# Patient Record
(demographics unavailable — no encounter records)

---

## 2023-07-20 NOTE — P.MSEPDOC
Presenting Problems





- Arrival Data


Date of Arrival on Unit: 23


Time of Arrival on Unit: 19:11


Mode of Transport: Ambulatory





- Complaint


OB-Reason for Admission/Chief Complaint: Possible Onset of Labor


Comment: pt states that she has period cramping for a few days





Prenatal Medical History





- Pregnancy Information


: 3


Para: 1


Term: 0


: 1


Abortions: Spontaneous or Elective: 1


Number of Living Children: 1





- Gestational Age


Gestational Age by TOM (wks/days): 34 Weeks and 1 Days





Review of Systems





- Review of Systems


Constitutional: No problems


Breast: No problems


ENT: No problems


Cardiovascular: No problems


Respiratory: No problems


Gastrointestinal: No problems


Genitourinary: No problems


Musculoskeletal: No problems


Neurological: No problems


Skin: No problems





Vital Signs





- Temperature


Temperature: 97.6 F


Temperature Source: Oral





- Pulse


  ** Pulse Oximetery


Pulse Rate: 99


Pulse Assessment Method: Pulse Oximetry





- Respirations


Respiratory Rate: 16


Oxygen Delivery Method: Room Air





- Blood Pressure


  ** Right Arm


Blood Pressure: 134/80


Blood Pressure Mean: 98


Blood Pressure Source: Automatic Cuff





Medical Screen Scoring





- Cervical Exam


Dilation (cm): 1


Effacement (%): 50


Station: -3


Membranes: Intact





- Uterine Contractions


Resting: Soft to palpation





- Fetal Assessment - Baby A


Baseline FHR: 130


Fetal Heart Rate - NICHD Category: Category I (Normal)


NST: Reactive





Physician Notification





- Physician Notified


Physician Notified Date: 23


Physician Notified Time: 19:44


Physician: Prosper Barillas


New Order Received: Yes





- Notification Comment


Comment: keep on monitor for 1 hour and then d/c home if no changes





Maternal Fetal Triage Index





- Maternal Fetal Triage Index


Presenting for scheduled procedure w/no complaint: No





- Stat/Priority 1


Stat Priority 1: No





- Urgent/Priority 2


Urgent Priority 2: No





- Prompt/Priority 3


Prompt Priority 3: Yes


Criteria Met for Priority 3: pt states that she has been having period cramping 

for a few days





Disposition





- Disposition


OB Disposition: Discharge to home


Discharge Date: 23


Discharge Time: 20:45


I agree with the RN Medical Screening Exam: Yes


Case reviewed; plan agreed upon as documented in EMR&OBIX.: Yes


Diagnosis: FALSE LABOR BEFORE 37 COMPLETED WEEKS OF GEST, THIRD TRI

## 2023-08-02 NOTE — P.MSEPDOC
Presenting Problems





- Arrival Data


Date of Arrival on Unit: 23


Time of Arrival on Unit: 21:25


Mode of Transport: Ambulatory





- Complaint


OB-Reason for Admission/Chief Complaint: Pain


Comment: vaginal pressure, back pain, contractions on and off today.





Prenatal Medical History





- Pregnancy Information


: 3


Para: 1


Term: 0


: 1


Abortions: Spontaneous or Elective: 1


Number of Living Children: 1





- Gestational Age


Gestational Age by TOM (wks/days): 36 Weeks and 0 Days





- Prenatal History


Pregnancy Complications: Preeclampsia


Comment: Preeclampsia induction with first pregnancy (35 5/7)





Review of Systems





- Review of Systems


Constitutional: No problems


Breast: No problems


ENT: No problems


Cardiovascular: Irregular heartbeat


Respiratory: No problems


Gastrointestinal: No problems


Genitourinary: No problems


Musculoskeletal: No problems


Neurological: No problems


Skin: No problems


Comment: Pt has history of SVT since before her first pregnancy, no cardiac meds

taken.





Vital Signs





- Temperature


Temperature: 98.3 F


Temperature Source: Oral





- Pulse


  ** Right Sitting


Pulse Rate: 93


Pulse Assessment Method: Automatic Cuff





- Respirations


Respiratory Rate: 16


Oxygen Delivery Method: Room Air


O2 Sat by Pulse Oximetry: 99





- Blood Pressure


  ** Right Arm Sitting


Blood Pressure: 135/81


Blood Pressure Mean: 99


Blood Pressure Source: Automatic Cuff





- Comment


Vital Signs Comment: second pressure 131/80





Medical Screen Scoring





- Cervical Exam


Dilation (cm): 2


Effacement (%): 70


Station: -3


Membranes: Intact





- Fetal Assessment - Baby A


Baseline FHR: 145


Fetal Heart Rate - NICHD Category: Category I (Normal)


NST: Reactive





Physician Notification





- Physician Notified


Physician Notified Date: 23


Physician Notified Time: 22:04


Physician: Dr Howe


New Order Received: Yes (Pt may be discharged home. Pt has apt tomorrow at 9am.)





- Notification Comment


Comment: Pt instructed to rest, increase fluid intake and to keep her sced apt 

for the am.





Maternal Fetal Triage Index





- Maternal Fetal Triage Index


Presenting for scheduled procedure w/no complaint: No





- Stat/Priority 1


Stat Priority 1: No





- Urgent/Priority 2


Urgent Priority 2: No





- Prompt/Priority 3


Prompt Priority 3: No





- Non-Urgent/Priority 4


Non-Urgent Priority 4: Yes


Criteria Met for Priority 4: vaginal pressure, back pain, contractions (non 

noted today while on the monitor), pt appears relaxed with no signs of distress.





Disposition





- Disposition


OB Disposition: Discharge to home, Written follow up instructions reviewed


Discharge Date: 23


Discharge Time: 22:10


I agree with the RN Medical Screening Exam: Yes


Case reviewed; plan agreed upon as documented in EMR&OBIX.: Yes


Diagnosis: FALSE LABOR BEFORE 37 COMPLETED WEEKS OF GEST, THIRD TRI

## 2023-08-05 NOTE — P.MSEPDOC
Presenting Problems





- Arrival Data


Date of Arrival on Unit: 23


Time of Arrival on Unit: 16:40


Mode of Transport: Ambulatory





- Complaint


OB-Reason for Admission/Chief Complaint: PIH


Comment: Headache for several days, increased BP, feeling "spacey"





Prenatal Medical History





- Pregnancy Information


: 3


Para: 1


Term: 0


: 1


Abortions: Spontaneous or Elective: 1


Number of Living Children: 1





- Gestational Age


Gestational Age by TOM (wks/days): 36 Weeks and 3 Days





- Prenatal History


Pregnancy Complications: Prior 





Review of Systems





- Review of Systems


Constitutional: No problems


Breast: No problems


ENT: No problems


Cardiovascular: No problems


Respiratory: No problems


Gastrointestinal: No problems


Genitourinary: No problems


Musculoskeletal: No problems


Neurological: No problems


Skin: No problems





Vital Signs





- Temperature


Temperature: 98.3 F


Temperature Source: Temporal Artery Scan





- Pulse


  ** Right Sitting Brachial


Pulse Rate: 107


Pulse Assessment Method: Pulse Oximetry





- Respirations


Respiratory Rate: 16


Oxygen Delivery Method: Room Air


O2 Sat by Pulse Oximetry: 96





- Blood Pressure


  ** Right Arm Sitting


Blood Pressure: 143/93


Blood Pressure Mean: 109


Blood Pressure Source: Automatic Cuff





Medical Screen Scoring





- Fetal Assessment - Baby A


Baseline FHR: 145


Fetal Heart Rate - NICHD Category: Category I (Normal)


NST: Reactive





Physician Notification





- Physician Notified


Physician Notified Date: 23


Physician Notified Time: 18:35


Physician: Maribel Asencio


New Order Received: Yes





- Notification Comment


Comment: 1711: Hellen c\Dr. Asencio, advsd , 36 3/7, arrives to triage feeling 

unwell, hx of.  preeclampsia, SVT and asthma. Reviewed BPs, headache and stomach

pain. Ord rec'd for PIH.  labs.  1835: Hellen c\Dr. Asencio, reviewed pts blood 

pressures and labs. Order rec'd for pt to.  be discharged and return  for 

NST and PIH labs.





Maternal Fetal Triage Index





- Maternal Fetal Triage Index


Presenting for scheduled procedure w/no complaint: No





- Stat/Priority 1


Stat Priority 1: No





- Urgent/Priority 2


Urgent Priority 2: Yes


Provider Notified: Maribel Asencio


Provider Notified Time: 17:11


Criteria Met for Priority 2: 's/90's





Disposition





- Disposition


OB Disposition: Discharge to home, Written follow up instructions reviewed


Discharge Date: 23


Discharge Time: 18:45


I agree with the RN Medical Screening Exam: Yes


Case reviewed; plan agreed upon as documented in EMR&OBIX.: Yes


Diagnosis: GESTATIONAL HTN W/O SIGNIFICANT PROTEINURIA, THIRD TRIMESTER

## 2023-08-08 NOTE — P.MSEPDOC
Presenting Problems





- Arrival Data


Date of Arrival on Unit: 23


Time of Arrival on Unit: 18:27


Mode of Transport: Ambulatory





- Complaint


OB-Reason for Admission/Chief Complaint: NST, Other


Comment: pt to triage for repeat pih labs and nst, pt was in.  triage  and 

informed to come back  for.  recheck. Pt staets unable to come 

tomorrow, so decided to.  come in this evening





Prenatal Medical History





- Pregnancy Information


: 3


Para: 1


Term: 0


: 1


Abortions: Spontaneous or Elective: 1


Number of Living Children: 1





- Gestational Age


Gestational Age by TOM (wks/days): 36 Weeks and 6 Days





- Prenatal History


Pregnancy Complications: Prior 


Comment: Hx pre-eclampsia in a prior pregnancy





Review of Systems





- Review of Systems


Constitutional: No problems


Breast: No problems


ENT: No problems


Cardiovascular: No problems


Respiratory: No problems


Gastrointestinal: No problems


Genitourinary: No problems


Musculoskeletal: No problems


Neurological: No problems


Skin: No problems





Vital Signs





- Temperature


Temperature: 97.8 F


Temperature Source: Temporal Artery Scan





- Pulse


  ** Pulse Oximetery


Pulse Rate: 96


Pulse Assessment Method: Pulse Oximetry





- Respirations


Respiratory Rate: 16


Oxygen Delivery Method: Room Air


O2 Sat by Pulse Oximetry: 98





- Blood Pressure


  ** Right Arm


Blood Pressure: 118/73


Blood Pressure Mean: 88


Blood Pressure Source: Automatic Cuff





Medical Screen Scoring





- Cervical Exam


Dilation (cm): 2


Effacement (%): 60


Station: -3


Membranes: Intact





- Fetal Assessment - Baby A


Baseline FHR: 145


Fetal Heart Rate - NICHD Category: Category I (Normal)


NST: Reactive





Physician Notification





- Physician Notified


Physician Notified Date: 23


Physician Notified Time: 18:47


Physician: Yuliya Howe





- Notification Comment


Comment: Called Dr. Howe, informed that pt in triage,.  was in triage and 

informed on 23 to come into.  triage tomorrow for repeat pih labs and nst. 

pt c/o.  feeling the same as she did on saturday, with no.  improvement of 

symptoms, and unable to come in.  tomorrow, so decided to come in this evening. 

Pts bps.  in triage this visit are 139/90 and 134/90, reactive.  nst, no 

contractions on monitor.  2023 18:49 Velma Inman Comment: Pt has f/u 

appt with 





Maternal Fetal Triage Index





- Maternal Fetal Triage Index


Presenting for scheduled procedure w/no complaint: No





- Stat/Priority 1


Stat Priority 1: No





- Urgent/Priority 2


Urgent Priority 2: No





- Prompt/Priority 3


Prompt Priority 3: No





- Non-Urgent/Priority 4


Non-Urgent Priority 4: Yes


Criteria Met for Priority 4: pt to triage for repeat pih labs and nst, pt was 

in.  triage  and informed to come back  for.  recheck. Pt staets 

unable to come tomorrow, so decided to.  come in this evening





Disposition





- Disposition


OB Disposition: Discharge to home


Discharge Date: 23


Discharge Time: 19:32


I agree with the RN Medical Screening Exam: Yes


Physician's MSE Comment: 











Preeclampsia labs were negative 2 days ago.  Blood pressures are in the 

nonsevere category.  Patient has an appointment with Dr. Asencio and 2 days.  She

does have a diagnosis of gestational hypertension but not preeclampsia.


Case reviewed; plan agreed upon as documented in EMR&OBIX.: Yes


Diagnosis: GESTATIONAL HTN W/O SIGNIFICANT PROTEINURIA, THIRD TRIMESTER

## 2023-08-11 NOTE — P.HPOB
History of Present Illness


H&P Date: 23


Chief Complaint: Induction of labor





21-year-old  presents at 37 weeks and 3 days for induction of labor due to 

gestational hypertension.  Fetal heart tones 135 with moderate variability and 

reactive her cervix is dilated 3 cm, 70% effaced, -2 station.





Review of Systems


All systems: negative


Constitutional: Denies chills, Denies fever


Eyes: denies blurred vision, denies pain


Ears, nose, mouth and throat: Denies headache, Denies sore throat


Cardiovascular: Denies chest pain, Denies shortness of breath


Respiratory: Denies cough


Gastrointestinal: Denies abdominal pain, Denies diarrhea, Denies nausea, Denies 

vomiting


Genitourinary: Denies dysuria, Denies hematuria


Musculoskeletal: Denies myalgias


Integumentary: Denies pruritus, Denies rash


Neurological: Denies numbness, Denies weakness


Psychiatric: Denies anxiety, Denies depression


Endocrine: Denies fatigue, Denies weight change





Past Medical History


Past Medical History: Asthma, GERD/Reflux, Musculoskeletal Disorder, Pneumonia


Additional Past Medical History / Comment(s): Degenerative Disc Disease,covid- 

antibody infusion, SVT, preclampsia


History of Any Multi-Drug Resistant Organisms: None Reported


Past Surgical History: Adenoidectomy, Cholecystectomy, Tonsillectomy


Additional Past Surgical History / Comment(s): [place] teeth removed, ganglion 

cyst on left hand removed, D&C,


Past Anesthesia/Blood Transfusion Reactions: No Reported Reaction


Past Psychological History: No Psychological Hx Reported


Smoking Status: Never smoker


Past Alcohol Use History: None Reported


Past Drug Use History: None Reported





- Past Family History


  ** Mother


Family Medical History: No Reported History





Medications and Allergies


                                Home Medications











 Medication  Instructions  Recorded  Confirmed  Type


 


Prenatal Vit No.180/Iron/Folic 1 tab PO HS 21 History





[Prenatal Plus Tablet]    


 


Ondansetron Odt [Zofran ODT] 8 mg PO ONCE PRN 23 History


 


Aspirin [Adult Low Dose Aspirin EC] 81 mg PO DAILY 23 History


 


Omeprazole [PriLOSEC] 40 cap PO DAILY 23 History








                                    Allergies











Allergy/AdvReac Type Severity Reaction Status Date / Time


 


poppyseed oil Allergy Severe Anaphylaxis Verified 23 19:10














Exam


Osteopathic Statement: *.  No significant issues noted on an osteopathic 

structural exam other than those noted in the History and Physical/Consult.


                                   Vital Signs











  Temp Pulse Resp BP Pulse Ox


 


 23 06:24  97.4 F L  94  16  134/83  97








                                Intake and Output











 08/10/23 08/11/23 08/11/23





 22:59 06:59 14:59


 


Other:   


 


  Weight  73.028 kg 














Heart: Regular rate and rhythm


Lungs: Clear to auscultation bilaterally


Abdomen: Soft, nontender


Extremities: Negative Homans sign





Results


Result Diagrams: 


                                 23 07:17





                  Abnormal Lab Results - Last 24 Hours (Table)











  23 Range/Units





  07:17 


 


WBC  13.6 H  (3.8-10.6)  k/uL


 


RBC  3.73 L  (3.80-5.40)  m/uL


 


Neutrophils #  10.8 H  (1.3-7.7)  k/uL














Assessment and Plan


(1) Gestational hypertension


Current Visit: Yes   Status: Acute   Code(s): O13.9 - GESTATIONAL HTN W/O 

SIGNIFICANT PROTEINURIA, UNSP TRIMESTER   SNOMED Code(s): 51057578


   





(2) History of pre-eclampsia in prior pregnancy, currently pregnant


Current Visit: Yes   Status: Acute   Code(s): O09.299 - SUPRVSN OF PREG W POOR R

EPRODCTV OR OBSTET HISTORY, UNSP TRI   SNOMED Code(s): 954090228168676


   


Plan: 





1.  Induction of labor with amniotomy and Pitocin


2.  Anticipate normal vaginal delivery

## 2023-08-11 NOTE — P.PROBDLV
Vaginal Delivery Note





- .


Vaginal Delivery Note: 





21-year-old  presents at 37 weeks and 3 days for induction of labor due to 

gestational hypertension.  Fetal heart tones 135 with moderate variability and 

reactive her cervix is dilated 3 cm, 70% effaced, -2 station.  Pitocin was 

started and amniotomy performed at 7:25 AM, clear fluid noted.  When patient was

uncomfortable she did get an epidural.  Her cervix was completely dilated after 

11 AM.  She pushed, delivered a viable male infant over intact perineum under 

epidural anesthesia when 15 a.m.  Head delivered OA, nuchal cord 1 delivered 

through, anterior shoulder delivered gentle downward guidance, posterior 

shoulder and rest of body.  Nose and mouth bulb suctioned, cord clamped and cut,

infant placed on mother's abdomen.  Apgars 9, 9, weight 6 lbs. 6 oz.  Placenta 

delivered spontaneously, intact with three-vessel cord at 11:17 AM.  Vagina, 

cervix, and perineum were inspected.  No lacerations noted.  Estimated blood 

loss 100 mL.  Mother and baby in stable condition.

## 2023-08-12 NOTE — P.DS
Providers


Date of admission: 


08/11/23 06:03





Expected date of discharge: 08/12/23


Attending physician: 


Maribel Asencio





Primary care physician: 


Stated None








- Discharge Diagnosis(es)


(1) Gestational hypertension


Current Visit: Yes   Status: Acute   





(2) History of pre-eclampsia in prior pregnancy, currently pregnant


Current Visit: Yes   Status: Acute   





(3) Normal vaginal delivery


Current Visit: Yes   Status: Acute   


Hospital Course: 





Patient presented for induction of labor due to gestational hypertension.  She 

underwent a normal vaginal delivery.  Postpartum course was uneventful.  She 

denies nausea, vomiting, chest pain, shortness of breath or calf pain.  She'll 

be discharged home postpartum day #1 in stable condition to follow-up with me in

6 weeks.





Plan - Discharge Summary


New Discharge Prescriptions: 


New


   Ibuprofen [Motrin] 600 mg PO Q6HR PRN #30 tab


     PRN Reason: Mild Pain Or Fever >= 100.5





No Action


   Ondansetron Odt [Zofran ODT] 8 mg PO ONCE PRN


     PRN Reason: Nausea And Vomiting


   Aspirin [Adult Low Dose Aspirin EC] 81 mg PO DAILY


   Omeprazole [PriLOSEC] 40 cap PO DAILY


   Prenatal Vit No.180/Iron/Folic [Prenatal Plus Tablet] 1 tab PO HS


Discharge Medication List





Prenatal Vit No.180/Iron/Folic [Prenatal Plus Tablet] 1 tab PO HS 04/18/21 

[History]


Ondansetron Odt [Zofran ODT] 8 mg PO ONCE PRN 01/19/23 [History]


Aspirin [Adult Low Dose Aspirin EC] 81 mg PO DAILY 03/08/23 [History]


Omeprazole [PriLOSEC] 40 cap PO DAILY 07/19/23 [History]


Ibuprofen [Motrin] 600 mg PO Q6HR PRN #30 tab 08/12/23 [Rx]








Follow up Appointment(s)/Referral(s): 


Maribel Asencio DO [Doctor of Osteopathic Medicine] - 6 Weeks

## 2023-08-13 NOTE — P.MSEPDOC
Presenting Problems





- Arrival Data


Date of Arrival on Unit: 23


Time of Arrival on Unit: 15:00


Mode of Transport: Ambulatory





- Complaint


OB-Reason for Admission/Chief Complaint: Decreased Fetal Movement





Prenatal Medical History





- Pregnancy Information


: 3


Para: 1


Term: 1


: 0


Abortions: Spontaneous or Elective: 1


Number of Living Children: 1





- Gestational Age


Gestational Age by TOM (wks/days): 37 Weeks and 1 Days





- Prenatal History


Comment: hx pree





Review of Systems





- Review of Systems


Constitutional: No problems


Breast: No problems


ENT: No problems


Cardiovascular: No problems


Respiratory: No problems


Gastrointestinal: No problems


Genitourinary: No problems


Musculoskeletal: No problems


Neurological: No problems


Skin: No problems





Vital Signs





- Temperature


Temperature: 97.9 F


Temperature Source: Temporal Artery Scan





- Pulse


  ** Right Sitting Brachial


Pulse Rate: 93


Pulse Assessment Method: Automatic Cuff





- Respirations


Respiratory Rate: 18


Oxygen Delivery Method: Room Air


O2 Sat by Pulse Oximetry: 98





- Blood Pressure


  ** Right Arm Sitting


Blood Pressure: 124/74


Blood Pressure Mean: 90


Blood Pressure Source: Automatic Cuff





Medical Screen Scoring





- Cervical Exam


Dilation (cm): 3


Effacement (%): 70


Station: -3


Membranes: Intact





- Fetal Assessment - Baby A


Baseline FHR: 130


Fetal Heart Rate - NICHD Category: Category I (Normal)


NST: Reactive





Physician Notification





- Physician Notified


Physician Notified Date: 23


Physician Notified Time: 15:20


Physician: Maribel Asencio


New Order Received: Yes





- Notification Comment


Comment: dc home. planned induction on 23





Maternal Fetal Triage Index





- Maternal Fetal Triage Index


Presenting for scheduled procedure w/no complaint: No





- Stat/Priority 1


Stat Priority 1: No





- Urgent/Priority 2


Urgent Priority 2: Yes


Provider Notified: Maribel Asencio


Provider Notified Time: 15:20


Criteria Met for Priority 2: Reactive NST





- Prompt/Priority 3


Prompt Priority 3: No





- Non-Urgent/Priority 4


Non-Urgent Priority 4: No





Disposition





- Disposition


OB Disposition: Discharge to home


Discharge Date: 23


Discharge Time: 15:32


I agree with the RN Medical Screening Exam: Yes


Case reviewed; plan agreed upon as documented in EMR&OBIX.: Yes


Diagnosis: DECREASED FETAL MOVEMENTS, SECOND TRIMESTER, FETUS 3

## 2023-08-30 NOTE — CT
EXAMINATION TYPE: CT abdomen pelvis wo con

 

DATE OF EXAM: 8/30/2023

 

HISTORY: Pt. delivered baby 8/11/23 at [facility] with Dr. Asencio. Pt. has hx of preeclampsia. Pt. c/o sharp
 HA and eye pressure. Pt. states she has had intermittent lower abdominal pain. Pt. currently on amox
icillin for UTI and possible placenta retention.

 

CT DLP: 474.5 mGycm.  Automated Exposure Control for Dose Reduction was Utilized.

 

TECHNIQUE:  CT scan of the abdomen and pelvis is performed without oral or IV contrast.

 

COMPARISON: CT abdomen and pelvis February 7, 2020

 

FINDINGS:  Within the limitations of a non-contrast study, the following observations are made.

 

LUNG BASES: No significant abnormality is appreciated.

 

LIVER/GB: No significant abnormality is appreciated.

 

PANCREAS: No significant abnormality is seen.

 

SPLEEN: No significant abnormality is seen.

 

ADRENALS: No significant abnormality is seen.

 

KIDNEYS: No renal stones or hydronephrosis is seen bilaterally.

 

BOWEL: Suboptimal evaluation without enteric contrast. No abnormal small or large bowel dilatation is
 seen.

 

GENITAL ORGANS: Anteverted slightly enlarged uterus correlates with patient's history of recent pregn
joe.

 

LYMPH NODES: No greater than 1cm abdominal or pelvic lymph nodes are appreciated.

 

OSSEOUS STRUCTURES: No significant abnormality is seen.

 

OTHER: No significant additional abnormality is seen.

 

IMPRESSION: No acute finding clearly seen on this noncontrast CT to account for patient's symptoms.

## 2023-08-30 NOTE — ED
Abdominal Pain HPI





- General


Chief Complaint: Abdominal Pain


Stated Complaint: Abd pain, headaches- 3 wks pp


Time Seen by Provider: 08/30/23 20:59


Source: patient


Mode of arrival: ambulatory


Limitations: no limitations





- History of Present Illness


Initial Comments: 


21-year-old female presenting to the ED with a chief complaint of abdominal 

pain.  Patient previously here on 8/25/23 for a chief complaint of abdominal 

pain.  At that time had a transvaginal ultrasound showed possible retained 

products with some free fluid present.  Patient was prescribed Augmentin.  

Patient states initially pain improving however today states onset of abdominal 

pain and diarrhea which feels different than the abdominal pain vomiting her to 

present to the ED on previous visit.  Also notes that she is having headache and

pressure behind the eyes.  States that headache is not the worst headache of her

life.  No nausea or vomiting.  Denies chest pain or shortness of breath.  No 

other complaints.








- Related Data


                                Home Medications











 Medication  Instructions  Recorded  Confirmed


 


Prenatal Vit No.180/Iron/Folic 1 tab PO HS 04/18/21 08/11/23





[Prenatal Plus Tablet]   


 


Ondansetron Odt [Zofran ODT] 8 mg PO ONCE PRN 01/19/23 08/11/23


 


Aspirin [Adult Low Dose Aspirin EC] 81 mg PO DAILY 03/08/23 08/11/23


 


Omeprazole [PriLOSEC] 40 cap PO DAILY 07/19/23 08/11/23








                                  Previous Rx's











 Medication  Instructions  Recorded


 


Ibuprofen [Motrin] 600 mg PO Q6HR PRN #30 tab 08/12/23


 


Amoxic-Pot Clav 875-125Mg 1 tab PO Q12HR #20 tablet 08/26/23





[Augmentin 875-125]  











                                    Allergies











Allergy/AdvReac Type Severity Reaction Status Date / Time


 


poppyseed oil Allergy Severe Anaphylaxis Verified 08/25/23 22:40














Review of Systems


ROS Statement: 


Those systems with pertinent positive or pertinent negative responses have been 

documented in the HPI.





ROS Other: All systems not noted in ROS Statement are negative.





Past Medical History


Past Medical History: Asthma, GERD/Reflux, Musculoskeletal Disorder, Pneumonia


Additional Past Medical History / Comment(s): Degenerative Disc Disease,covid- 

antibody infusion, SVT, preclampsia


History of Any Multi-Drug Resistant Organisms: None Reported


Past Surgical History: Adenoidectomy, Cholecystectomy, Tonsillectomy


Additional Past Surgical History / Comment(s): [place] teeth removed, ganglion 

cyst on left hand removed, D&C,


Past Anesthesia/Blood Transfusion Reactions: No Reported Reaction


Past Psychological History: No Psychological Hx Reported


Smoking Status: Never smoker


Past Alcohol Use History: None Reported


Past Drug Use History: None Reported





- Past Family History


  ** Mother


Family Medical History: No Reported History





General Exam


Limitations: no limitations


General appearance: alert, in no apparent distress


ENT exam: Present: mucous membranes moist


Neck exam: Present: normal inspection


Respiratory exam: Present: normal lung sounds bilaterally


Cardiovascular Exam: Present: regular rate, normal rhythm


GI/Abdominal exam: Present: soft (Soft.  No tenderness to palpation.  No rebound

guarding or rigidity.), normal bowel sounds


Extremities exam: Present: other (Strength and Sensation bilateral upper and 

lower extremities equal and intact.)


Neurological exam: Present: alert, oriented X3, CN II-XII intact, normal gait


Skin exam: Present: warm, dry





Course


                                   Vital Signs











  08/30/23





  20:48


 


Temperature 98.8 F


 


Pulse Rate 83


 


Respiratory 16





Rate 


 


Blood Pressure 130/85


 


O2 Sat by Pulse 98





Oximetry 














Medical Decision Making





- Medical Decision Making


Was pt. sent in by a medical professional or institution (, PA, NP, urgent 

care, hospital, or nursing home...) When possible be specific


@  -No


Did you speak to anyone other than the patient for history (EMS, parent, family,

police, friend...)? What history was obtained from this source 


@  -Spoke To the patient's mother who reports that the patient has been acting 

appropriately.


Did you review nursing and triage notes (agree or disagree)?  Why? 


@  -I reviewed and agree with nursing and triage notes


Were old charts reviewed (outside hosp., previous admission, EMS record, old 

EKG, old radiological studies, urgent care reports/EKG's, nursing home records)?

Report findings 


@  -No old charts were reviewed


Differential Diagnosis (chest pain, altered mental status, abdominal pain women,

abdominal pain men, vaginal bleeding, weakness, fever, dyspnea, syncope, headac

he, dizziness, GI bleed, back pain, seizure, CVA, palpatations, mental health, 

musculoskeletal)? 


@  -Differential Abdominal Pain Men:


Appendicitis, cholecystitis, diverticulosis, ischemic bowel, pancreatitis, 

hepatitis, UTI, gastroenteritis, AAA, incarcerated hernia, bowel obstruction, 

constipation, inflammatory bowel, hepatitis, peptic ulcer disease, splenic 

infarction, perforated viscus, testicular torsion, this is not meant to be an 

all-inclusive list


Differential Headache:


Migraine, tension, cluster, carbon monoxide, central venous thrombosis, pension 

karma temporal arteritis, acute closure glaucoma, intercranial hemorrhage, 

mastoiditis, sinusitis, head injury, this is not meant to be an all-inclusive 

list.


EKG interpreted by me (3pts min.).


@  -As above


X-rays interpreted by me (1pt min.).


@  -None done


CT interpreted by me (1pt min.).


@  -CT abdomen/pelvis showed no acute findings.


U/S interpreted by me (1pt. min.).


@  -None done


What testing was considered but not performed or refused? (CT, X-rays, U/S, 

labs)? Why?


@  -CT of the brain was considered however at this time patient's neurologic 

exam is unremarkable.  Patient states headache is not the worst headache of her 

life.  Additionally, per mother at bedside states patient is acting 

appropriately.


What meds were considered but not given or refused? Why?


@  -None


Did you discuss the management of the patient with other professionals 

(professionals i.e. , PA, NP, lab, RT, psych nurse, , , 

teacher, , )? Give summary


@  -No


Was smoking cessation discussed for >3mins.?


@  -No


Was critical care preformed (if so, how long)?


@  -No


Were there social determinants of health that impacted care today? How? 

(Homelessness, low income, unemployed, alcoholism, drug addiction, 

transportation, low edu. Level, literacy, decrease access to med. care, [facility], 

rehab)?


@  -No


Was there de-escalation of care discussed even if they declined (Discuss DNR or 

withdrawal of care, Hospice)? DNR status


@  -No


What co-morbidities impacted this encounter? (DM, HTN, Smoking, COPD, CAD, 

Cancer, CVA, ARF, Chemo, Hep., AIDS, mental health diagnosis, sleep apnea, 

morbid obesity)?


@  -None


Was patient admitted / discharged? Hospital course, mention meds given and 

route, prescriptions, significant lab abnormalities, going to OR and other 

pertinent info.


@  -Discharge.  Laboratory studies unremarkable.  No evidence of 

thrombocytopenia, proteinuria, elevation of liver enzymes.  This time, no 

evidence of preeclampsia, eclampsia or help syndrome.  ET abdomen and pelvis 

shows no acute findings.  In regards to possible retained products of conception

advises continuing antibiotics as prescribed and prompt follow-up with OB/GYN.  

At this time, patient's vital signs stable, afebrile.  Patient discharged home. 

Discussed return precautions with patient who verbalizes agreement.


Undiagnosed new problem with uncertain prognosis?


@  -No


Drug Therapy requiring intensive monitoring for toxicity (Heparin, Nitro, 

Insulin, Cardizem)?


@  -No


Were any procedures done?


@  -No


Diagnosis/symptom?


@  -Headache, abdominal pain


Acute, or Chronic, or Acute on Chronic?


@  -Acute


Uncomplicated (without systemic symptoms) or Complicated (systemic symptoms)?


@  -Uncomplicated


Side effects of treatment?


@  -No


Exacerbation, Progression, or Severe Exacerbation?


@  -No


Poses a threat to life or bodily function? How? (Chest pain, USA, MI, pneumonia,

PE, COPD, DKA, ARF, appy, cholecystitis, CVA, Diverticulitis, Homicidal, 

Suicidal, threat to staff... and all critical care pts)


@  -No








- Lab Data


Result diagrams: 


                                 08/30/23 21:23





                                 08/30/23 21:23


                                   Lab Results











  08/30/23 08/30/23 08/30/23 Range/Units





  21:23 21:23 21:23 


 


WBC  12.4 H    (3.8-10.6)  k/uL


 


RBC  4.48    (3.80-5.40)  m/uL


 


Hgb  14.5  D    (11.4-16.0)  gm/dL


 


Hct  42.9    (34.0-46.0)  %


 


MCV  95.8    (80.0-100.0)  fL


 


MCH  32.4    (25.0-35.0)  pg


 


MCHC  33.8    (31.0-37.0)  g/dL


 


RDW  12.0    (11.5-15.5)  %


 


Plt Count  358    (150-450)  k/uL


 


MPV  7.7    


 


Neutrophils %  74    %


 


Lymphocytes %  18    %


 


Monocytes %  6    %


 


Eosinophils %  2    %


 


Basophils %  0    %


 


Neutrophils #  9.1 H    (1.3-7.7)  k/uL


 


Lymphocytes #  2.2    (1.0-4.8)  k/uL


 


Monocytes #  0.7    (0-1.0)  k/uL


 


Eosinophils #  0.2    (0-0.7)  k/uL


 


Basophils #  0.0    (0-0.2)  k/uL


 


Sodium     (137-145)  mmol/L


 


Potassium     (3.5-5.1)  mmol/L


 


Chloride     ()  mmol/L


 


Carbon Dioxide     (22-30)  mmol/L


 


Anion Gap     mmol/L


 


BUN     (7-17)  mg/dL


 


Creatinine     (0.52-1.04)  mg/dL


 


Est GFR (CKD-EPI)AfAm     (>60 ml/min/1.73 sqM)  


 


Est GFR (CKD-EPI)NonAf     (>60 ml/min/1.73 sqM)  


 


Glucose     (74-99)  mg/dL


 


Calcium     (8.4-10.2)  mg/dL


 


Total Bilirubin     (0.2-1.3)  mg/dL


 


AST     (14-36)  U/L


 


ALT     (4-34)  U/L


 


Alkaline Phosphatase     ()  U/L


 


Total Protein     (6.3-8.2)  g/dL


 


Albumin     (3.5-5.0)  g/dL


 


Amylase     ()  U/L


 


Lipase     ()  U/L


 


Urine Color   Light Yellow   


 


Urine Appearance   Clear   (Clear)  


 


Urine pH   6.0   (5.0-8.0)  


 


Ur Specific Gravity   1.024   (1.001-1.035)  


 


Urine Protein   Negative   (Negative)  


 


Urine Glucose (UA)   Negative   (Negative)  


 


Urine Ketones   Negative   (Negative)  


 


Urine Blood   Negative   (Negative)  


 


Urine Nitrite   Negative   (Negative)  


 


Urine Bilirubin   Negative   (Negative)  


 


Urine Urobilinogen   2.0   (<2.0)  mg/dL


 


Ur Leukocyte Esterase   Negative   (Negative)  


 


Urine HCG, Qual    Not Detected  (Not Detectd)  














  08/30/23 Range/Units





  21:23 


 


WBC   (3.8-10.6)  k/uL


 


RBC   (3.80-5.40)  m/uL


 


Hgb   (11.4-16.0)  gm/dL


 


Hct   (34.0-46.0)  %


 


MCV   (80.0-100.0)  fL


 


MCH   (25.0-35.0)  pg


 


MCHC   (31.0-37.0)  g/dL


 


RDW   (11.5-15.5)  %


 


Plt Count   (150-450)  k/uL


 


MPV   


 


Neutrophils %   %


 


Lymphocytes %   %


 


Monocytes %   %


 


Eosinophils %   %


 


Basophils %   %


 


Neutrophils #   (1.3-7.7)  k/uL


 


Lymphocytes #   (1.0-4.8)  k/uL


 


Monocytes #   (0-1.0)  k/uL


 


Eosinophils #   (0-0.7)  k/uL


 


Basophils #   (0-0.2)  k/uL


 


Sodium  139  (137-145)  mmol/L


 


Potassium  3.9  (3.5-5.1)  mmol/L


 


Chloride  104  ()  mmol/L


 


Carbon Dioxide  25  (22-30)  mmol/L


 


Anion Gap  10  mmol/L


 


BUN  10  (7-17)  mg/dL


 


Creatinine  0.64  (0.52-1.04)  mg/dL


 


Est GFR (CKD-EPI)AfAm  >90  (>60 ml/min/1.73 sqM)  


 


Est GFR (CKD-EPI)NonAf  >90  (>60 ml/min/1.73 sqM)  


 


Glucose  83  (74-99)  mg/dL


 


Calcium  8.9  (8.4-10.2)  mg/dL


 


Total Bilirubin  0.4  (0.2-1.3)  mg/dL


 


AST  25  (14-36)  U/L


 


ALT  22  (4-34)  U/L


 


Alkaline Phosphatase  106  ()  U/L


 


Total Protein  7.1  (6.3-8.2)  g/dL


 


Albumin  4.0  (3.5-5.0)  g/dL


 


Amylase  61  ()  U/L


 


Lipase  66  ()  U/L


 


Urine Color   


 


Urine Appearance   (Clear)  


 


Urine pH   (5.0-8.0)  


 


Ur Specific Gravity   (1.001-1.035)  


 


Urine Protein   (Negative)  


 


Urine Glucose (UA)   (Negative)  


 


Urine Ketones   (Negative)  


 


Urine Blood   (Negative)  


 


Urine Nitrite   (Negative)  


 


Urine Bilirubin   (Negative)  


 


Urine Urobilinogen   (<2.0)  mg/dL


 


Ur Leukocyte Esterase   (Negative)  


 


Urine HCG, Qual   (Not Detectd)  














Disposition


Clinical Impression: 


 Abdominal pain, Headache





Disposition: HOME SELF-CARE


Condition: Good


Instructions (If sedation given, give patient instructions):  Abdominal Pain 

(ED), Acute Headache (ED)


Additional Instructions: 


Please return to the Emergency Department if symptoms worsen or any other 

concerns.


Is patient prescribed a controlled substance at d/c from ED?: No


Referrals: 


Damian Roldan DO [Primary Care Provider] - 1-2 days


Time of Disposition: 23:38

## 2023-11-15 NOTE — XR
EXAM:

  XR Chest, 2 Views

 

CLINICAL HISTORY:

  ITS.REASON XR Reason: Chest Pain

 

TECHNIQUE:

  Frontal and lateral views of the chest.

 

COMPARISON:

  Chest radiograph on 2/5/2023

 

FINDINGS:

  Hardware: None.

 

  Lungs/pleura: Normal. No focal consolidation. No pleural effusion or 

pneumothorax.

 

  Heart/mediastinum: Normal. No cardiomegaly.

 

  Soft tissues: Unremarkable.

 

  Bones: No acute fracture.

 

  Upper abdomen: Normal.

 

IMPRESSION:   

  No acute disease identified.

## 2023-11-15 NOTE — ED
Chest Pain HPI





- General


Chief Complaint: Chest Pain


Stated Complaint: Chest Pain


Time Seen by Provider: 11/14/23 22:00


Source: patient


Mode of arrival: ambulatory


Limitations: no limitations





- History of Present Illness


Initial Comments: 





21-year-old female with past history of asthma, SVT and GERD who presents 

emergency department reporting chest pain.  States that around 7 PM she began 

having chest pain which radiates into her bilateral arms.  Symptoms are worse 

with walking but are better with rest.  She did not take any medications for her

symptoms before coming into the ER.  She denies any shortness of breath.  No 

nausea or vomiting.  No ripping or tearing cessation to her back.  Denies any 

headaches or visual changes.  No fevers, chills or cough.  Denies pleuritic 

pain.  She denies any abdominal pain.  No numbness or tingling into her 

extremities. No concern for pregnancy.  Pain is not reproducible with palpation.

 No other alleviating, precipitating or modifying factors





- Related Data


                                Home Medications











 Medication  Instructions  Recorded  Confirmed


 


Prenatal Vit No.180/Iron/Folic 1 tab PO HS 04/18/21 08/11/23





[Prenatal Plus Tablet]   


 


Ondansetron Odt [Zofran ODT] 8 mg PO ONCE PRN 01/19/23 08/11/23


 


Aspirin [Adult Low Dose Aspirin EC] 81 mg PO DAILY 03/08/23 08/11/23


 


Omeprazole [PriLOSEC] 40 cap PO DAILY 07/19/23 08/11/23








                                  Previous Rx's











 Medication  Instructions  Recorded


 


Ibuprofen [Motrin] 600 mg PO Q6HR PRN #30 tab 08/12/23


 


Amoxic-Pot Clav 875-125Mg 1 tab PO Q12HR #20 tablet 08/26/23





[Augmentin 875-125]  











                                    Allergies











Allergy/AdvReac Type Severity Reaction Status Date / Time


 


poppyseed oil Allergy Severe Anaphylaxis Verified 11/14/23 21:58














Review of Systems


ROS Statement: 


Those systems with pertinent positive or pertinent negative responses have been 

documented in the HPI.





ROS Other: All systems not noted in ROS Statement are negative.





Past Medical History


Past Medical History: Asthma, GERD/Reflux, Musculoskeletal Disorder, Pneumonia, 

Supraventricular Tachycardia (SVT)


Additional Past Medical History / Comment(s): Degenerative Disc Disease,covid- 

antibody infusion, SVT, preclampsia


History of Any Multi-Drug Resistant Organisms: None Reported


Past Surgical History: Adenoidectomy, Cholecystectomy, Tonsillectomy


Additional Past Surgical History / Comment(s): [place] teeth removed, ganglion 

cyst on left hand removed, D&C,


Past Anesthesia/Blood Transfusion Reactions: No Reported Reaction


Past Psychological History: No Psychological Hx Reported


Smoking Status: Never smoker


Past Alcohol Use History: None Reported


Past Drug Use History: None Reported





- Past Family History


  ** Mother


Family Medical History: No Reported History





General Exam


Limitations: no limitations


General appearance: alert, in no apparent distress


Head exam: Present: atraumatic, normocephalic, normal inspection


Eye exam: Present: normal appearance, PERRL, EOMI.  Absent: scleral icterus, 

conjunctival injection, periorbital swelling


ENT exam: Present: normal exam, mucous membranes moist


Neck exam: Present: normal inspection.  Absent: tenderness, meningismus, 

lymphadenopathy


Respiratory exam: Present: normal lung sounds bilaterally.  Absent: respiratory 

distress, wheezes, rales, rhonchi, stridor


Cardiovascular Exam: Present: regular rate, normal rhythm, normal heart sounds. 

Absent: systolic murmur, diastolic murmur, rubs, gallop, clicks


GI/Abdominal exam: Present: soft, normal bowel sounds.  Absent: distended, 

tenderness, guarding, rebound, rigid


Extremities exam: Present: normal inspection, full ROM, normal capillary refill.

 Absent: tenderness, pedal edema, joint swelling, calf tenderness


Back exam: Present: normal inspection


Neurological exam: Present: alert, oriented X3, CN II-XII intact


Psychiatric exam: Present: normal affect, normal mood


Skin exam: Present: warm, dry, intact, normal color.  Absent: rash





Course


                                   Vital Signs











  11/14/23 11/14/23 11/14/23





  21:55 23:00 23:07


 


Temperature 98.1 F  


 


Pulse Rate 76 59 L 61


 


Respiratory 18 18 18





Rate   


 


Blood Pressure 134/82 115/73 115/73


 


O2 Sat by Pulse 99 97 98





Oximetry   














  11/15/23 11/15/23





  00:00 01:07


 


Temperature  98.2 F


 


Pulse Rate 71 54 L


 


Respiratory 16 18





Rate  


 


Blood Pressure 119/68 118/59


 


O2 Sat by Pulse 99 98





Oximetry  














Chest Pain MDM





- MDM





Was pt. sent in by a medical professional or institution (, PA, NP, urgent 

care, hospital, or nursing home...) When possible be specific


@  -No


Did you speak to anyone other than the patient for history (EMS, parent, family,

police, friend...)? What history was obtained from this source 


@  -No


Did you review nursing and triage notes (agree or disagree)?  Why? 


@  -I reviewed and agree with nursing and triage notes


Were old charts reviewed (outside hosp., previous admission, EMS record, old 

EKG, old radiological studies, urgent care reports/EKG's, nursing home records)?

Report findings 


@  -No old charts were reviewed


Differential Diagnosis (chest pain, altered mental status, abdominal pain women,

abdominal pain men, vaginal bleeding, weakness, fever, dyspnea, syncope, 

headache, dizziness, GI bleed, back pain, seizure, CVA, palpatations, mental 

health, musculoskeletal)? 


@  -Differential Chest Pain:


Stable Angina, Unstable Angina, STEMI, NSTEMI Aortic Dissection, Pneumothorax, 

Musculoskeletal, Esophageal Spasm GERD, Cholecystitis, Pancreatitis, Zoster, 

this is not meant to be an all-inclusive list. 


EKG interpreted by me (3pts min.).


@  -Yes and demonstrates sinus rhythm with rate of 70. CA interval 130.  QRS 86.

 QTC of 396.  No acute ST segment elevations or depressions


X-rays interpreted by me (1pt min.).


@  -Yes and demonstrates no acute process


CT interpreted by me (1pt min.).


@  -None done


U/S interpreted by me (1pt. min.).


@  -None done


What testing was considered but not performed or refused? (CT, X-rays, U/S, 

labs)? Why?


@  -None


What meds were considered but not given or refused? Why?


@  -None


Did you discuss the management of the patient with other professionals 

(professionals i.e. , PA, NP, lab, RT, psych nurse, , , 

teacher, , )? Give summary


@  -No


Was smoking cessation discussed for >3mins.?


@  -No


Was critical care preformed (if so, how long)?


@  -No


Were there social determinants of health that impacted care today? How? 

(Homelessness, low income, unemployed, alcoholism, drug addiction, 

transportation, low edu. Level, literacy, decrease access to med. care, penitentiary, 

rehab)?


@  -No


Was there de-escalation of care discussed even if they declined (Discuss DNR or 

withdrawal of care, Hospice)? DNR status


@  -No


What co-morbidities impacted this encounter? (DM, HTN, Smoking, COPD, CAD, 

Cancer, CVA, ARF, Chemo, Hep., AIDS, mental health diagnosis, sleep apnea, 

morbid obesity)?


@  -SVT


Was patient admitted / discharged? Hospital course, mention meds given and 

route, prescriptions, significant lab abnormalities, going to OR and other 

pertinent info.


@  -Upon arrival patient was placed in room 22.  Thorough history and physical 

exam was performed.  12-lead EKG is obtained.  Patient placed on continuous 

pulse ox and cardiac monitoring.  12-lead EKG demonstrates normal sinus rhythm. 

Laboratory studies are conducted and are within normal limits.  Chest x-ray 

demonstrated no acute process.  I did discuss diagnosis, differential and 

treatment options.  At this time the patient is stable for discharge home.  She 

does have an appointment on Monday to see her cardiologist and get an echo 

performed.  She is instructed to keep this appointment.  Return should he have 

any new or worsening symptoms.  Patient agreeable with plan and was discharged 

in stable condition


Undiagnosed new problem with uncertain prognosis?


@  -yes


Drug Therapy requiring intensive monitoring for toxicity (Heparin, Nitro, 

Insulin, Cardizem)?


@  -No


Were any procedures done?


@  -No


Diagnosis/symptom?


@  -Acute chest pain


Acute, or Chronic, or Acute on Chronic?


@  -Acute


Uncomplicated (without systemic symptoms) or Complicated (systemic symptoms)?


@  -Complicated


Side effects of treatment?


@  -No


Exacerbation, Progression, or Severe Exacerbation?


@  -No


Poses a threat to life or bodily function? How? (Chest pain, USA, MI, pneumonia,

PE, COPD, DKA, ARF, appy, cholecystitis, CVA, Diverticulitis, Homicidal, 

Suicidal, threat to staff... and all critical care pts)


@  -No





Disposition


Clinical Impression: 


 Chest pain





Disposition: HOME SELF-CARE


Condition: Stable


Instructions (If sedation given, give patient instructions):  Chest Pain (ED)


Additional Instructions: 


I recommend further testing to include an echo.  Return for any new or worsening

symptoms


Is patient prescribed a controlled substance at d/c from ED?: No


Referrals: 


Damian Roldan DO [Primary Care Provider] - 1-2 days


Time of Disposition: 00:33

## 2024-05-05 NOTE — ED
Abdominal Pain HPI





- General


Source: patient, RN notes reviewed


Mode of arrival: ambulatory


Limitations: no limitations





<Nacho Marino - Last Filed: 05/05/24 05:12>





<Sergio Huang - Last Filed: 05/06/24 04:58>





- General


Chief Complaint: Abdominal Pain


Stated Complaint: abd,nausea


Time Seen by Provider: 05/05/24 02:00





- History of Present Illness


Initial Comments: 


22-year-old female presented to the ED with complaints of abdominal pain.  

Patient reports over the past few weeks has had pain in the middle of her 

abdomen which has been intermittent however tonight suddenly increased in 

severity with some associated nausea and vomiting.  Denies urinary symptoms.  

Also notes that she has been having some diarrhea as well.  Denies fever or 

chills.  No chest pain or shortness of breath.  No other complaints at this 

time. (Nacho Marino)





- Related Data


                                Home Medications











 Medication  Instructions  Recorded  Confirmed


 


Prenatal Vit No.180/Iron/Folic 1 tab PO HS 04/18/21 08/11/23





[Prenatal Plus Tablet]   


 


Ondansetron Odt [Zofran ODT] 8 mg PO ONCE PRN 01/19/23 08/11/23


 


Aspirin [Adult Low Dose Aspirin EC] 81 mg PO DAILY 03/08/23 08/11/23


 


Omeprazole [PriLOSEC] 40 cap PO DAILY 07/19/23 08/11/23








                                  Previous Rx's











 Medication  Instructions  Recorded


 


Ibuprofen [Motrin] 600 mg PO Q6HR PRN #30 tab 08/12/23


 


Amoxic-Pot Clav 875-125Mg 1 tab PO Q12HR #20 tablet 08/26/23





[Augmentin 875-125]  


 


Famotidine [Pepcid] 20 mg PO BID #14 tablet 05/05/24


 


Ondansetron Odt [Zofran ODT] 4 mg PO Q8HR PRN #10 tab 05/05/24











                                    Allergies











Allergy/AdvReac Type Severity Reaction Status Date / Time


 


poppyseed oil Allergy Severe Anaphylaxis Verified 05/05/24 00:04














Review of Systems


ROS Other: All systems not noted in ROS Statement are negative.





<Nacho Marino - Last Filed: 05/05/24 05:12>


ROS Other: All systems not noted in ROS Statement are negative.





<Sergio Huang - Last Filed: 05/06/24 04:58>


ROS Statement: 


Those systems with pertinent positive or pertinent negative responses have been 

documented in the HPI.








Past Medical History


Past Medical History: Asthma, GERD/Reflux, Pneumonia, Supraventricular 

Tachycardia (SVT)


Additional Past Medical History / Comment(s): Degenerative Disc Disease,covid- 

antibody infusion, SVT, preclampsia


History of Any Multi-Drug Resistant Organisms: None Reported


Past Surgical History: Adenoidectomy, Cholecystectomy, Tonsillectomy


Additional Past Surgical History / Comment(s): [place] teeth removed, ganglion 

cyst on left hand removed, D&C,


Past Anesthesia/Blood Transfusion Reactions: No Reported Reaction


Past Psychological History: No Psychological Hx Reported


Smoking Status: Never smoker


Past Alcohol Use History: None Reported


Past Drug Use History: None Reported





- Past Family History


  ** Mother


Family Medical History: No Reported History





<Nacho Marino - Last Filed: 05/05/24 05:12>





General Exam


Limitations: no limitations


General appearance: alert, in no apparent distress


Eye exam: Present: normal appearance


Neck exam: Present: normal inspection


Respiratory exam: Present: normal lung sounds bilaterally


Cardiovascular Exam: Present: regular rate, normal rhythm


GI/Abdominal exam: Present: soft (Umbilical tenderness to palpation.  No rebound

guarding or rigidity.  Negative Law sign.  No McBurney's point tenderness to 

palpation.  Negative Rovsing sign.)


Neurological exam: Present: alert, oriented X3


Skin exam: Present: warm, dry





<Nacho Marino - Last Filed: 05/05/24 05:12>





- General Exam Comments


Initial Comments: 


Visual Physical Exam





Vital signs reviewed





General: Well-appearing, nontoxic, no acute distress.


Head: Normocephalic, atraumatic


Eyes: PERRLA, EOMI


ENT: Airway patent


Chest: Nonlabored breathing


Skin: No visual rash, normal skin tone


Neuro: Alert and oriented 3


Musculoskeletal: No gross abnormalities


 (Nacho Marino)





Course


                                   Vital Signs











  05/05/24 05/05/24





  00:02 07:10


 


Temperature 98 F 98.7 F


 


Pulse Rate 81 80


 


Respiratory 18 18





Rate  


 


Blood Pressure 124/85 116/74


 


O2 Sat by Pulse 98 98





Oximetry  














Medical Decision Making





- Lab Data


Result diagrams: 


                                 05/05/24 03:10





                                 05/05/24 04:33





<Nacho Marino - Last Filed: 05/05/24 05:12>





- Lab Data


Result diagrams: 


                                 05/05/24 03:10





                                 05/05/24 04:33





<Sergio Huang - Last Filed: 05/06/24 04:58>





- Medical Decision Making


Quicknote portion performed. Signed Nacho Marino PA-C





Was pt. sent in by a medical professional or institution (, PA, NP, urgent 

care, hospital, or nursing home...) When possible be specific


@  -No


Did you speak to anyone other than the patient for history (EMS, parent, family,

police, friend...)? What history was obtained from this source 


@  -No


Did you review nursing and triage notes (agree or disagree)?  Why? 


@  -I reviewed and agree with nursing and triage notes


Were old charts reviewed (outside hosp., previous admission, EMS record, old 

EKG, old radiological studies, urgent care reports/EKG's, nursing home records)?

Report findings 


@  -No old charts were reviewed


Differential Diagnosis (chest pain, altered mental status, abdominal pain women,

abdominal pain men, vaginal bleeding, weakness, fever, dyspnea, syncope, 

headache, dizziness, GI bleed, back pain, seizure, CVA, palpatations, mental 

health, musculoskeletal)? 


@  -Differential Abdominal Pain Women:


Appendicitis, Cholecystitis, diverticulosis, ischemic bowel, pancreatitis, 

hepatitis, UTI, gastroenteritis, AAA, incarcerated hernia, bowel obstruction, 

constipation, inflammatory bowel, hepatitis, peptic ulcer disease, splenic inf

arction, perforated viscus, vulvitis, ovarian torsion, PID, kidney stone, 

placenta abruption, this is not meant to be an all-inclusive list


EKG interpreted by me (3pts min.).


@  -None


X-rays interpreted by me (1pt min.).


@  -None done


CT interpreted by me (1pt min.).


@  -CT abdomen pelvis pending


U/S interpreted by me (1pt. min.).


@  -None done


What testing was considered but not performed or refused? (CT, X-rays, U/S, 

labs)? Why?


@  -None


What meds were considered but not given or refused? Why?


@  -None


Did you discuss the management of the patient with other professionals 

(professionals i.e. , PA, NP, lab, RT, psych nurse, , , 

teacher, , )? Give summary


@  -No


Was smoking cessation discussed for >3mins.?


@  -No


Was critical care preformed (if so, how long)?


@  -No


Were there social determinants of health that impacted care today? How? 

(Homelessness, low income, unemployed, alcoholism, drug addiction, 

transportation, low edu. Level, literacy, decrease access to med. care, skilled nursing, 

rehab)?


@  -No


Was there de-escalation of care discussed even if they declined (Discuss DNR or 

withdrawal of care, Hospice)? DNR status


@  -No


What co-morbidities impacted this encounter? (DM, HTN, Smoking, COPD, CAD, Can

cer, CVA, ARF, Chemo, Hep., AIDS, mental health diagnosis, sleep apnea, morbid 

obesity)?


@  -None


Was patient admitted / discharged? Hospital course, mention meds given and 

route, prescriptions, significant lab abnormalities, going to OR and other 

pertinent info.


@  -Pending





22-year-old female presents to the ED with complaints of abdominal pain.  States

ongoing for few weeks but tonight acutely worsened with some associated nausea 

vomiting and diarrhea.  Laboratory studies reviewed.  Labs including CBC, CMP, 

UA, urine hCG unremarkable.  Disposition pending CT abdomen pelvis.  Case signed

out to my attending physician, Dr. Eulogio Stack, for further disposition. (Nacho Amador)








Was patient admitted / discharged? Hospital course, mention meds given and r

oute, prescriptions, significant lab abnormalities, going to OR and other 

pertinent info.


@  -[I reevaluated the patient following the study and she is feeling better 

following treatment.  We discussed appropriate further care and follow-up as 

well as return parameters.


Undiagnosed new problem with uncertain prognosis?


@  -[No]


Drug Therapy requiring intensive monitoring for toxicity (Heparin, Nitro, 

Insulin, Cardizem)?


@  -[No]


Were any procedures done?


@  -[No]


Diagnosis/symptom?


@  -[Acute gastroenteritis


Acute, or Chronic, or Acute on Chronic?


@  -[Acute


Uncomplicated (without systemic symptoms) or Complicated (systemic symptoms)?


@  -[Uncomplicated


Side effects of treatment?


@  -[No]


Exacerbation, Progression, or Severe Exacerbation?


@  -[No]


Poses a threat to life or bodily function? How? (Chest pain, USA, MI, pneumonia,

 PE, COPD, DKA, ARF, appy, cholecystitis, CVA, Diverticulitis, Homicidal, 

Suicidal, threat to staff... and all critical care pts)


@  -[No]


 (Sergio Huang)





- Lab Data


                                   Lab Results











  05/05/24 05/05/24 05/05/24 Range/Units





  00:05 00:05 03:10 


 


WBC    15.6 H  (3.8-10.6)  k/uL


 


RBC    4.04  (3.80-5.40)  m/uL


 


Hgb    13.0  (11.4-16.0)  gm/dL


 


Hct    38.3  (34.0-46.0)  %


 


MCV    94.7  (80.0-100.0)  fL


 


MCH    32.2  (25.0-35.0)  pg


 


MCHC    33.9  (31.0-37.0)  g/dL


 


RDW    12.3  (11.5-15.5)  %


 


Plt Count    352  (150-450)  k/uL


 


MPV    8.0  


 


Neutrophils %    88  %


 


Lymphocytes %    6  %


 


Monocytes %    4  %


 


Eosinophils %    1  %


 


Basophils %    0  %


 


Neutrophils #    13.7 H  (1.3-7.7)  k/uL


 


Lymphocytes #    0.9 L  (1.0-4.8)  k/uL


 


Monocytes #    0.6  (0-1.0)  k/uL


 


Eosinophils #    0.2  (0-0.7)  k/uL


 


Basophils #    0.0  (0-0.2)  k/uL


 


Sodium     (137-145)  mmol/L


 


Potassium     (3.5-5.1)  mmol/L


 


Chloride     ()  mmol/L


 


Carbon Dioxide     (22-30)  mmol/L


 


Anion Gap     mmol/L


 


BUN     (7-17)  mg/dL


 


Creatinine     (0.52-1.04)  mg/dL


 


Est GFR (CKD-EPI)AfAm     (>60 ml/min/1.73 sqM)  


 


Est GFR (CKD-EPI)NonAf     (>60 ml/min/1.73 sqM)  


 


Glucose     (74-99)  mg/dL


 


Calcium     (8.4-10.2)  mg/dL


 


Total Bilirubin     (0.2-1.3)  mg/dL


 


AST     (14-36)  U/L


 


ALT     (4-34)  U/L


 


Alkaline Phosphatase     ()  U/L


 


Total Protein     (6.3-8.2)  g/dL


 


Albumin     (3.5-5.0)  g/dL


 


Amylase     ()  U/L


 


Lipase     ()  U/L


 


Urine Color  Colorless    


 


Urine Appearance  Clear    (Clear)  


 


Urine pH  6.5    (5.0-8.0)  


 


Ur Specific Gravity  1.023    (1.001-1.035)  


 


Urine Protein  Negative    (Negative)  


 


Urine Glucose (UA)  Negative    (Negative)  


 


Urine Ketones  Negative    (Negative)  


 


Urine Blood  Negative    (Negative)  


 


Urine Nitrite  Negative    (Negative)  


 


Urine Bilirubin  Negative    (Negative)  


 


Urine Urobilinogen  <2.0    (<2.0)  mg/dL


 


Ur Leukocyte Esterase  Negative    (Negative)  


 


Urine HCG, Qual   Not Detected   (Not Detectd)  














  05/05/24 Range/Units





  04:33 


 


WBC   (3.8-10.6)  k/uL


 


RBC   (3.80-5.40)  m/uL


 


Hgb   (11.4-16.0)  gm/dL


 


Hct   (34.0-46.0)  %


 


MCV   (80.0-100.0)  fL


 


MCH   (25.0-35.0)  pg


 


MCHC   (31.0-37.0)  g/dL


 


RDW   (11.5-15.5)  %


 


Plt Count   (150-450)  k/uL


 


MPV   


 


Neutrophils %   %


 


Lymphocytes %   %


 


Monocytes %   %


 


Eosinophils %   %


 


Basophils %   %


 


Neutrophils #   (1.3-7.7)  k/uL


 


Lymphocytes #   (1.0-4.8)  k/uL


 


Monocytes #   (0-1.0)  k/uL


 


Eosinophils #   (0-0.7)  k/uL


 


Basophils #   (0-0.2)  k/uL


 


Sodium  137  (137-145)  mmol/L


 


Potassium  4.7  (3.5-5.1)  mmol/L


 


Chloride  110 H  ()  mmol/L


 


Carbon Dioxide  20 L  (22-30)  mmol/L


 


Anion Gap  7  mmol/L


 


BUN  9  (7-17)  mg/dL


 


Creatinine  0.55  (0.52-1.04)  mg/dL


 


Est GFR (CKD-EPI)AfAm  >90  (>60 ml/min/1.73 sqM)  


 


Est GFR (CKD-EPI)NonAf  >90  (>60 ml/min/1.73 sqM)  


 


Glucose  94  (74-99)  mg/dL


 


Calcium  8.0 L  (8.4-10.2)  mg/dL


 


Total Bilirubin  0.6  (0.2-1.3)  mg/dL


 


AST  20  (14-36)  U/L


 


ALT  18  (4-34)  U/L


 


Alkaline Phosphatase  60  ()  U/L


 


Total Protein  6.2 L  (6.3-8.2)  g/dL


 


Albumin  3.5  (3.5-5.0)  g/dL


 


Amylase  60  ()  U/L


 


Lipase  83  ()  U/L


 


Urine Color   


 


Urine Appearance   (Clear)  


 


Urine pH   (5.0-8.0)  


 


Ur Specific Gravity   (1.001-1.035)  


 


Urine Protein   (Negative)  


 


Urine Glucose (UA)   (Negative)  


 


Urine Ketones   (Negative)  


 


Urine Blood   (Negative)  


 


Urine Nitrite   (Negative)  


 


Urine Bilirubin   (Negative)  


 


Urine Urobilinogen   (<2.0)  mg/dL


 


Ur Leukocyte Esterase   (Negative)  


 


Urine HCG, Qual   (Not Detectd)  














Disposition





<Nacho Marino - Last Filed: 05/05/24 05:12>


Is patient prescribed a controlled substance at d/c from ED?: No





<Sergio Huang - Last Filed: 05/06/24 04:58>


Clinical Impression: 


 Gastroenteritis





Disposition: HOME SELF-CARE


Condition: Good


Instructions (If sedation given, give patient instructions):  Gastroenteritis 

(ED)


Prescriptions: 


Famotidine [Pepcid] 20 mg PO BID #14 tablet


Ondansetron Odt [Zofran ODT] 4 mg PO Q8HR PRN #10 tab


 PRN Reason: Nausea


Referrals: 


Damian Roldan DO [Primary Care Provider] - 1-2 days

## 2024-05-05 NOTE — CT
EXAM:

  CT Abdomen and Pelvis With Intravenous Contrast

 

CLINICAL HISTORY:

  ITS.REASON CT Reason: umbilical abd pain

 

TECHNIQUE:

  Axial computed tomography images of the abdomen and pelvis with 

intravenous contrast.  CTDI is 14 mGy and DLP is 663.4 mGy-cm.  This CT 

exam was performed using one or more of the following dose reduction 

techniques: automated exposure control, adjustment of the mA and/or kV 

according to patient size, and/or use of iterative reconstruction 

technique.

 

COMPARISON:

  CT dated 8/30/2023

 

FINDINGS:

  Lung bases:  Unremarkable.  No mass.  No consolidation.

 

 ABDOMEN:

  Liver:  The liver is enlarged with uniform decreased density consistent 

with hepatic steatosis.  No evidence of hepatic mass.

  Gallbladder and bile ducts:  Unremarkable.  No calcified stones.  No 

ductal dilation.

  Pancreas:  Unremarkable.  No mass.  No ductal dilation.

  Spleen:  Mild prominence of the spleen without evidence of mass.

  Adrenals:  Unremarkable.  No mass.

  Kidneys and ureters:  Unremarkable.  No solid mass.  No hydronephrosis.

  Stomach and bowel:  Fluid density is seen within the colon without 

evidence of colonic wall thickening.  No evidence of obstruction.  Mildly 

prominent fluid-filled loops of small bowel are seen within the pelvis.  

Wall thickening of small bowel seen within the upper abdomen.  No 

evidence of small bowel obstruction.

 

 PELVIS:

  Appendix:  No findings to suggest acute appendicitis.

  Bladder:  Unremarkable.  No mass.

  Reproductive:  Unremarkable as visualized.

 

 ABDOMEN and PELVIS:

  Intraperitoneal space:  Unremarkable.  No free air.  No significant 

fluid collection.

  Bones/joints:  No acute fracture.  No dislocation.

  Soft tissues:  Unremarkable.

  Vasculature:  Unremarkable.  No abdominal aortic aneurysm.

  Lymph nodes:  Unremarkable.  No enlarged lymph nodes.

 

IMPRESSION:     

  Findings likely representing infectious or inflammatory enteritis with 

a fluid density seen within the colon suggesting increased transit state.

## 2024-10-19 NOTE — XR
EXAMINATION TYPE: XR chest 2V

 

DATE OF EXAM: 10/19/2024

 

COMPARISON: Chest x-ray November 15, 2023

 

HISTORY: Chest pain

 

TECHNIQUE:  Frontal and lateral views of the chest are obtained.

 

FINDINGS:  There is no focal air space opacity, pleural effusion, or pneumothorax seen.  The cardiac 
silhouette size remains within normal limits.   The osseous structures are intact.

 

IMPRESSION:  No acute process.

 

 

[facility], Workstation: [id number], 10/19/2024 11:32 PM

## 2024-10-19 NOTE — ED
Chest Pain HPI





- General


Source: patient


Mode of arrival: ambulatory


Limitations: no limitations





<Florencia Bingham - Last Filed: 10/19/24 23:20>





<Sergio Huang - Last Filed: 10/20/24 01:55>





- General


Chief Complaint: Chest Pain


Stated Complaint: Chest Pain


Time Seen by Provider: 10/19/24 23:20





- History of Present Illness


Initial Comments: 


22-year-old female presenting with chief complaint of chest pain.  States she 

has had intermittent chest pain throughout the day today.  She also states that 

she had an episode where she felt like her heart was fluttering and she feels 

her heart beating very hard inside her chest.  Admits to shortness of breath.  

No lower extremity swelling.


 (Florencia Bingham)





- Related Data


                                Home Medications











 Medication  Instructions  Recorded  Confirmed


 


Prenatal Vit No.180/Iron/Folic 1 tab PO HS 04/18/21 08/11/23





[Prenatal Plus Tablet]   


 


Ondansetron Odt [Zofran ODT] 8 mg PO ONCE PRN 01/19/23 08/11/23


 


Aspirin [Adult Low Dose Aspirin EC] 81 mg PO DAILY 03/08/23 08/11/23


 


Omeprazole [PriLOSEC] 40 cap PO DAILY 07/19/23 08/11/23








                                  Previous Rx's











 Medication  Instructions  Recorded


 


Ibuprofen [Motrin] 600 mg PO Q6HR PRN #30 tab 08/12/23


 


Amoxic-Pot Clav 875-125Mg 1 tab PO Q12HR #20 tablet 08/26/23





[Augmentin 875-125]  


 


Famotidine [Pepcid] 20 mg PO BID #14 tablet 05/05/24


 


Ondansetron Odt [Zofran ODT] 4 mg PO Q8HR PRN #10 tab 05/05/24











                                    Allergies











Allergy/AdvReac Type Severity Reaction Status Date / Time


 


poppyseed oil Allergy Severe Anaphylaxis Verified 10/19/24 23:18














Review of Systems


ROS Other: All systems not noted in ROS Statement are negative.





<Florencia Bingahm - Last Filed: 10/19/24 23:20>


ROS Other: All systems not noted in ROS Statement are negative.





<Sergio Huang - Last Filed: 10/20/24 01:55>


ROS Statement: 


Those systems with pertinent positive or pertinent negative responses have been 

documented in the HPI.








Past Medical History


Past Medical History: Asthma, GERD/Reflux, Pneumonia, Supraventricular 

Tachycardia (SVT)


Additional Past Medical History / Comment(s): Degenerative Disc Disease,covid- 

antibody infusion, SVT, preclampsia


History of Any Multi-Drug Resistant Organisms: None Reported


Past Surgical History: Adenoidectomy, Cholecystectomy, Tonsillectomy


Additional Past Surgical History / Comment(s): [place] teeth removed, ganglion 

cyst on left hand removed, D&C,


Past Anesthesia/Blood Transfusion Reactions: No Reported Reaction


Past Psychological History: No Psychological Hx Reported


Smoking Status: Never smoker


Past Alcohol Use History: None Reported


Past Drug Use History: None Reported





- Past Family History


  ** Mother


Family Medical History: No Reported History





<Florencia Bingham - Last Filed: 10/19/24 23:20>





General Exam


Limitations: no limitations





<Florencia Bingham - Last Filed: 10/19/24 23:20>





- General Exam Comments


Initial Comments: 


Visual Physical Exam





Vital signs reviewed





General: Well-appearing, nontoxic, no acute distress.


Head: Normocephalic, atraumatic


Eyes: PERRLA, EOMI


ENT: Airway patent


Chest: Nonlabored breathing


Skin: No visual rash, normal skin tone


Neuro: Alert and oriented 3


Musculoskeletal: No gross abnormalities





 (Florencia Bingham)





Course





                                   Vital Signs











  10/19/24





  23:14


 


Temperature 98.2 F


 


Pulse Rate 85


 


Respiratory 18





Rate 


 


Blood Pressure 146/92


 


O2 Sat by Pulse 99





Oximetry 














Chest Pain MDM





<Florencia Bingham - Last Filed: 10/19/24 23:20>





- MDM


I performed the quick note portion of this visit, electronically signed Florencia Bingham PA-C


 (Florencia Bingham)





Disposition





<Florencia Bingham - Last Filed: 10/19/24 23:20>


Is patient prescribed a controlled substance at d/c from ED?: No





<Sergio Huang - Last Filed: 10/20/24 01:55>


Clinical Impression: 


 Palpitations





Disposition: HOME SELF-CARE


Condition: Good


Instructions (If sedation given, give patient instructions):  Heart Palpitations

(DC)


Referrals: 


Damian Roldan DO [Primary Care Provider] - 1-2 days

## 2025-02-21 NOTE — ED
Recheck HPI





- General


Chief Complaint: OB/Uterine Contractions


Stated Complaint: NVD


Time Seen by Provider: 02/21/25 14:36


Source: patient, RN notes reviewed


Mode of arrival: ambulatory


Limitations: no limitations





- History of Present Illness


Initial Comments: 





This is a 23-year-old female presenting for OB follow-up after recent discovery 

of positive hCG.  Patient states she is unable to schedule an OB appointment in 

a timely manner and would like further OB workup due to history of preeclampsia 

and ectopic concern.  Endorses some abdominal pain and nausea which is ongoing 

since her last ER visit on 2/13/2025.  Patient is also requesting a resend of 

Reglan as well as prenatal vitamins.  Denies fever, chills, dizziness, chest 

pain, dyspnea, vomiting, diarrhea, constipation, urinary symptoms.


Symptoms Since Prior Visit: no new symptoms


Associated Symptoms: abdominal pain





- Related Data


                                Home Medications











 Medication  Instructions  Recorded  Confirmed


 


Prenatal Vit No.180/Iron/Folic 1 tab PO HS 04/18/21 08/11/23





[Prenatal Plus Tablet]   


 


Ondansetron Odt [Zofran ODT] 8 mg PO ONCE PRN 01/19/23 08/11/23


 


Aspirin [Adult Low Dose Aspirin EC] 81 mg PO DAILY 03/08/23 08/11/23


 


Omeprazole [PriLOSEC] 40 cap PO DAILY 07/19/23 08/11/23








                                  Previous Rx's











 Medication  Instructions  Recorded


 


Ibuprofen [Motrin] 600 mg PO Q6HR PRN #30 tab 08/12/23


 


Amoxic-Pot Clav 875-125Mg 1 tab PO Q12HR #20 tablet 08/26/23





[Augmentin 875-125]  


 


Famotidine [Pepcid] 20 mg PO BID #14 tablet 05/05/24


 


Ondansetron Odt [Zofran ODT] 4 mg PO Q8HR PRN #10 tab 05/05/24


 


Metoclopramide [Reglan] 10 mg PO Q8H PRN #15 tab 02/14/25


 


Metoclopramide [Reglan] 10 mg PO Q8H #15 tab 02/21/25


 


Prenatal Vit-Iron-Folic Acid 1 each PO DAILY #30 cap 02/21/25





[Prenatal-U Capsule]  











                                    Allergies











Allergy/AdvReac Type Severity Reaction Status Date / Time


 


poppyseed oil Allergy Severe Anaphylaxis Verified 02/21/25 14:35














Review of Systems


ROS Statement: 


Those systems with pertinent positive or pertinent negative responses have been 

documented in the HPI.





ROS Other: All systems not noted in ROS Statement are negative.





Past Medical History


Past Medical History: Asthma, GERD/Reflux, Pneumonia, Supraventricular 

Tachycardia (SVT)


Additional Past Medical History / Comment(s): Degenerative Disc Disease,covid- 

antibody infusion, SVT, preclampsia


History of Any Multi-Drug Resistant Organisms: None Reported


Past Surgical History: Adenoidectomy, Cholecystectomy, Tonsillectomy


Additional Past Surgical History / Comment(s): [place] teeth removed, ganglion 

cyst on left hand removed, D&C,


Past Anesthesia/Blood Transfusion Reactions: No Reported Reaction


Past Psychological History: No Psychological Hx Reported


Smoking Status: Never smoker


Past Alcohol Use History: None Reported


Past Drug Use History: None Reported





- Past Family History


  ** Mother


Family Medical History: No Reported History





General Exam


Limitations: no limitations


General appearance: alert, in no apparent distress


Head exam: Present: atraumatic, normocephalic, normal inspection


Eye exam: Present: normal appearance, PERRL, EOMI.  Absent: scleral icterus, 

conjunctival injection, periorbital swelling


ENT exam: Present: normal exam, mucous membranes moist


Neck exam: Present: normal inspection.  Absent: tenderness, meningismus, 

lymphadenopathy


Respiratory exam: Present: normal lung sounds bilaterally.  Absent: respiratory 

distress, wheezes, rales, rhonchi, stridor


Cardiovascular Exam: Present: regular rate, normal rhythm, normal heart sounds. 

Absent: systolic murmur, diastolic murmur, rubs, gallop, clicks


GI/Abdominal exam: Present: soft, tenderness (Positive epigastric tenderness 

without guarding.  Remaining abdominal exam unremarkable), normal bowel sounds. 

Absent: distended, guarding, rebound, rigid, pulsatile mass


Extremities exam: Present: normal inspection, full ROM, normal capillary refill.

 Absent: tenderness, pedal edema, joint swelling, calf tenderness


Back exam: Present: normal inspection


Neurological exam: Present: alert, oriented X3, CN II-XII intact


Psychiatric exam: Present: normal affect, normal mood


Skin exam: Present: warm, dry, intact, normal color.  Absent: rash





Course


                                   Vital Signs











  02/21/25





  14:33


 


Temperature 99.0 F


 


Pulse Rate 88


 


Respiratory 18





Rate 


 


Blood Pressure 126/88


 


O2 Sat by Pulse 98





Oximetry 














Medical Decision Making





- Medical Decision Making





Was pt. sent in by a medical professional or institution (, PA, NP, urgent 

care, hospital, or nursing home...) When possible be specific


@ -No


Did you speak to anyone other than the patient for history (EMS, parent, family,

police, friend...)? What history was obtained from this source 


@ -No


Did you review nursing and triage notes (agree or disagree)? Why? 


@ -I reviewed and agree with nursing and triage notes


Were old charts reviewed (outside hosp., previous admission, EMS record, old 

EKG, old radiological studies, urgent care reports/EKG's, nursing home records)?

Report findings 


@ -Reviewed chart from ER visit on 2/13/2025 when patient was diagnosed with 

gastroenteritis and declined urine hCG.  Urine hCG from 2/9/2025 was negative


Differential Diagnosis (chest pain, altered mental status, abdominal pain women,

abdominal pain men, vaginal bleeding, weakness, fever, dyspnea, syncope, he

adache, dizziness, GI bleed, back pain, seizure, CVA, palpatations, mental 

health, musculoskeletal)? 


@ -Differential Abdominal Pain Women:


Appendicitis, Cholecystitis, diverticulosis, ischemic bowel, pancreatitis, 

hepatitis, UTI, gastroenteritis, AAA, incarcerated hernia, bowel obstruction, 

constipation, inflammatory bowel, hepatitis, peptic ulcer disease, splenic 

infarction, perforated viscus, vulvitis, ovarian torsion, PID, kidney stone, 

placenta abruption, this is not meant to be an all-inclusive list





EKG interpreted by me (3pts min.).


@ -Not done


X-rays interpreted by me (1pt min.).


@ -None done


CT interpreted by me (1pt min.).


@ -None done


U/S interpreted by me (1pt. min.).


@ -None done


What testing was considered but not performed or refused? (CT, X-rays, U/S, 

labs)? Why?


@ -Transvaginal ultrasound considered but held due to negative urine hCG and 

extremely low quantitative hCG


What meds were considered but not given or refused? Why?


@ -None


Did you discuss the management of the patient with other professionals 

(professionals i.e. , PA, NP, lab, RT, psych nurse, , , 

teacher, , )? Give summary


@ -No


Was smoking cessation discussed for >3mins.?


@ -No


Was critical care preformed (if so, how long)?


@ -No


Were there social determinants of health that impacted care today? How? 

(Homelessness, low income, unemployed, alcoholism, drug addiction, 

transportation, low edu. Level, literacy, decrease access to med. care, [facility], 

rehab)?


@ -No


Was there de-escalation of care discussed even if they declined (Discuss DNR or 

withdrawal of care, Hospice)? DNR status


@ -No


What co-morbidities impacted this encounter? (DM, HTN, Smoking, COPD, CAD, 

Cancer, CVA, ARF, Chemo, Hep., AIDS, mental health diagnosis, sleep apnea, 

morbid obesity)?


@ -None


Was patient admitted / discharged? Hospital course, mention meds given and 

route, prescriptions, significant lab abnormalities, going to OR and other 

pertinent info.


@ -Lab work and UA are generally unremarkable.  Quantitative hCG 53.5. 

Transvaginal ultrasound considered but held due to negative urine hCG and 

extremely low quantitative hCG. Reglan and prenatal vitamins sent to patient's 

pharmacy.  Advised follow-up with PCP/OB in the next 48 hours for repeat 

quantitative hCG.  Discussed patient with Dr. Huang.


Undiagnosed new problem with uncertain prognosis?


@ -No


Drug Therapy requiring intensive monitoring for toxicity (Heparin, Nitro, 

Insulin, Cardizem)?


@ -No


Were any procedures done?


@ -No


Diagnosis/symptom?


@ -Gastroenteritis


Acute, or Chronic, or Acute on Chronic?


@ -Acute


Uncomplicated (without systemic symptoms) or Complicated (systemic symptoms)?


@ -Uncomplicated


Side effects of treatment?


@ -No


Exacerbation, Progression, or Severe Exacerbation?


@ -No


Poses a threat to life or bodily function? How? (Chest pain, USA, MI, pneumonia,

PE, COPD, DKA, ARF, appy, cholecystitis, CVA, Diverticulitis, Homicidal, 

Suicidal, threat to staff... and all critical care pts)


@ -No





- Lab Data


Result diagrams: 


                                 02/21/25 15:30





                                 02/21/25 15:30


                                   Lab Results











  02/21/25 02/21/25 02/21/25 Range/Units





  14:54 14:54 15:30 


 


WBC    10.3  (3.8-10.6)  k/uL


 


RBC    4.36  (3.80-5.40)  m/uL


 


Hgb    13.9  (11.4-16.0)  gm/dL


 


Hct    40.5  (34.0-46.0)  %


 


MCV    92.8  (80.0-100.0)  fL


 


MCH    31.8  (25.0-35.0)  pg


 


MCHC    34.3  (31.0-37.0)  g/dL


 


RDW    12.0  (11.5-15.5)  %


 


Plt Count    372  (150-450)  k/uL


 


MPV    6.7  


 


Neutrophils %    66  %


 


Lymphocytes %    28  %


 


Monocytes %    4  %


 


Eosinophils %    1  %


 


Basophils %    0  %


 


Neutrophils #    6.8  (1.3-7.7)  k/uL


 


Lymphocytes #    2.9  (1.0-4.8)  k/uL


 


Monocytes #    0.4  (0-1.0)  k/uL


 


Eosinophils #    0.1  (0-0.7)  k/uL


 


Basophils #    0.0  (0-0.2)  k/uL


 


Sodium     (137-145)  mmol/L


 


Potassium     (3.5-5.1)  mmol/L


 


Chloride     ()  mmol/L


 


Carbon Dioxide     (22-30)  mmol/L


 


Anion Gap     mmol/L


 


BUN     (7-17)  mg/dL


 


Creatinine     (0.52-1.04)  mg/dL


 


Est GFR (CKD-EPI)AfAm     (>60 ml/min/1.73 sqM)  


 


Est GFR (CKD-EPI)NonAf     (>60 ml/min/1.73 sqM)  


 


Glucose     (74-99)  mg/dL


 


Calcium     (8.4-10.2)  mg/dL


 


Total Bilirubin     (0.2-1.3)  mg/dL


 


AST     (14-36)  U/L


 


ALT     (4-34)  U/L


 


Alkaline Phosphatase     ()  U/L


 


Total Protein     (6.3-8.2)  g/dL


 


Albumin     (3.5-5.0)  g/dL


 


HCG, Quant     mIU/mL


 


Urine Color  Colorless    


 


Urine Appearance  Cloudy H    (Clear)  


 


Urine pH  6.5    (5.0-8.0)  


 


Ur Specific Gravity  1.007    (1.001-1.035)  


 


Urine Protein  Negative    (Negative)  


 


Urine Glucose (UA)  Negative    (Negative)  


 


Urine Ketones  Negative    (Negative)  


 


Urine Blood  Negative    (Negative)  


 


Urine Nitrite  Negative    (Negative)  


 


Urine Bilirubin  Negative    (Negative)  


 


Urine Urobilinogen  <2.0    (<2.0)  mg/dL


 


Ur Leukocyte Esterase  Moderate H    (Negative)  


 


Urine RBC  1    (0-5)  /hpf


 


Urine WBC  3    (0-5)  /hpf


 


Ur Squamous Epith Cells  5 H    (0-4)  /hpf


 


Urine Bacteria  Rare H    (None)  /hpf


 


Urine Mucus  Rare H    (None)  /hpf


 


Urine HCG, Qual   Not Detected   (Not Detectd)  














  02/21/25 Range/Units





  15:30 


 


WBC   (3.8-10.6)  k/uL


 


RBC   (3.80-5.40)  m/uL


 


Hgb   (11.4-16.0)  gm/dL


 


Hct   (34.0-46.0)  %


 


MCV   (80.0-100.0)  fL


 


MCH   (25.0-35.0)  pg


 


MCHC   (31.0-37.0)  g/dL


 


RDW   (11.5-15.5)  %


 


Plt Count   (150-450)  k/uL


 


MPV   


 


Neutrophils %   %


 


Lymphocytes %   %


 


Monocytes %   %


 


Eosinophils %   %


 


Basophils %   %


 


Neutrophils #   (1.3-7.7)  k/uL


 


Lymphocytes #   (1.0-4.8)  k/uL


 


Monocytes #   (0-1.0)  k/uL


 


Eosinophils #   (0-0.7)  k/uL


 


Basophils #   (0-0.2)  k/uL


 


Sodium  135 L  (137-145)  mmol/L


 


Potassium  3.9  (3.5-5.1)  mmol/L


 


Chloride  100  ()  mmol/L


 


Carbon Dioxide  25  (22-30)  mmol/L


 


Anion Gap  10  mmol/L


 


BUN  11  (7-17)  mg/dL


 


Creatinine  0.72  (0.52-1.04)  mg/dL


 


Est GFR (CKD-EPI)AfAm  >90  (>60 ml/min/1.73 sqM)  


 


Est GFR (CKD-EPI)NonAf  >90  (>60 ml/min/1.73 sqM)  


 


Glucose  85  (74-99)  mg/dL


 


Calcium  9.7  (8.4-10.2)  mg/dL


 


Total Bilirubin  0.7  (0.2-1.3)  mg/dL


 


AST  21  (14-36)  U/L


 


ALT  29  (4-34)  U/L


 


Alkaline Phosphatase  74  ()  U/L


 


Total Protein  7.4  (6.3-8.2)  g/dL


 


Albumin  4.7  (3.5-5.0)  g/dL


 


HCG, Quant  53.5  mIU/mL


 


Urine Color   


 


Urine Appearance   (Clear)  


 


Urine pH   (5.0-8.0)  


 


Ur Specific Gravity   (1.001-1.035)  


 


Urine Protein   (Negative)  


 


Urine Glucose (UA)   (Negative)  


 


Urine Ketones   (Negative)  


 


Urine Blood   (Negative)  


 


Urine Nitrite   (Negative)  


 


Urine Bilirubin   (Negative)  


 


Urine Urobilinogen   (<2.0)  mg/dL


 


Ur Leukocyte Esterase   (Negative)  


 


Urine RBC   (0-5)  /hpf


 


Urine WBC   (0-5)  /hpf


 


Ur Squamous Epith Cells   (0-4)  /hpf


 


Urine Bacteria   (None)  /hpf


 


Urine Mucus   (None)  /hpf


 


Urine HCG, Qual   (Not Detectd)  














Disposition


Clinical Impression: 


 Gastroenteritis





Disposition: HOME SELF-CARE


Condition: Good


Additional Instructions: 


With PCP/OB in the next 48 hours for repeat quantitative hCG


Prescriptions: 


Prenatal Vit-Iron-Folic Acid [Prenatal-U Capsule] 1 each PO DAILY #30 cap


Metoclopramide [Reglan] 10 mg PO Q8H #15 tab


Is patient prescribed a controlled substance at d/c from ED?: No


Referrals: 


Damian Roldan DO [Primary Care Provider] - 1-2 days


Time of Disposition: 16:41

## 2025-03-06 NOTE — ED
General Adult HPI





- General


Chief complaint: Upper Respiratory Infection


Stated complaint: pregnant (week unk), chest pain, SOB


Time Seen by Provider: 25 10:04


Source: patient, RN notes reviewed


Mode of arrival: ambulatory


Limitations: no limitations





- History of Present Illness


Initial comments: 


This is a 23-year-old female with a history of asthma, [id number], presenting to 

the emergency department with multiple complaints.  He states over the past 3 to

4 days she has been experiencing productive cough, chest congestion, difficulty 

breathing and overall not feeling well.  She states that her boyfriend is 

currently being treated for pneumonia.  Additionally, patient's last menstrual 

cycle was 2025 and believes she is approximately 6 weeks gestation and has 

been experiencing lower abdominal cramping over the past 2 to 3 days.  She has 

been having outpatient hCG serum testing that has been increasing however she 

has not had an ultrasound yet confirming an intrauterine pregnancy.  She denies 

hematuria, dysuria, flank pain, vaginal bleeding, fevers or chills. previous 

surgical abdominal history of cholecystectomy. 








- Related Data


                                Home Medications











 Medication  Instructions  Recorded  Confirmed


 


Prenatal Vit No.180/Iron/Folic 1 tab PO HS 21





[Prenatal Plus Tablet]   


 


Ondansetron Odt [Zofran ODT] 8 mg PO ONCE PRN 23


 


Aspirin [Adult Low Dose Aspirin EC] 81 mg PO DAILY 23


 


Omeprazole [PriLOSEC] 40 cap PO DAILY 23








                                  Previous Rx's











 Medication  Instructions  Recorded


 


Ibuprofen [Motrin] 600 mg PO Q6HR PRN #30 tab 23


 


Amoxic-Pot Clav 875-125Mg 1 tab PO Q12HR #20 tablet 23





[Augmentin 875-125]  


 


Famotidine [Pepcid] 20 mg PO BID #14 tablet 24


 


Ondansetron Odt [Zofran ODT] 4 mg PO Q8HR PRN #10 tab 24


 


Metoclopramide [Reglan] 10 mg PO Q8H PRN #15 tab 25


 


Metoclopramide [Reglan] 10 mg PO Q8H #15 tab 25


 


Prenatal Vit-Iron-Folic Acid 1 each PO DAILY #30 cap 25





[Prenatal-U Capsule]  


 


Albuterol Inhaler [Ventolin Hfa 1 - 2 puff INHALATION Q6H PRN #1 25





Inhaler] each 











                                    Allergies











Allergy/AdvReac Type Severity Reaction Status Date / Time


 


poppyseed oil Allergy Severe Anaphylaxis Verified 25 09:34














Review of Systems


ROS Statement: 


Those systems with pertinent positive or pertinent negative responses have been 

documented in the HPI.





ROS Other: All systems not noted in ROS Statement are negative.





Past Medical History


Past Medical History: Asthma, GERD/Reflux, Pneumonia, Supraventricular 

Tachycardia (SVT)


Additional Past Medical History / Comment(s): Degenerative Disc Disease,covid- 

antibody infusion, SVT, preclampsia


History of Any Multi-Drug Resistant Organisms: None Reported


Past Surgical History: Adenoidectomy, Cholecystectomy, Tonsillectomy


Additional Past Surgical History / Comment(s): [place] teeth removed, ganglion 

cyst on left hand removed, D&C,


Past Anesthesia/Blood Transfusion Reactions: No Reported Reaction


Past Psychological History: No Psychological Hx Reported


Smoking Status: Never smoker


Past Alcohol Use History: None Reported


Past Drug Use History: None Reported





- Past Family History


  ** Mother


Family Medical History: No Reported History





General Exam


Limitations: no limitations


General appearance: alert, in no apparent distress


Neck exam: Present: normal inspection.  Absent: tenderness, meningismus, ly

mphadenopathy


Respiratory exam: Present: normal lung sounds bilaterally.  Absent: respiratory 

distress, wheezes, rales, rhonchi, stridor


Cardiovascular Exam: Present: regular rate, normal rhythm, normal heart sounds. 

Absent: systolic murmur, diastolic murmur, rubs, gallop, clicks


GI/Abdominal exam: Present: soft, tenderness (lower abdomen, most notable right 

pelvic), normal bowel sounds.  Absent: distended, guarding, rebound, rigid


Extremities exam: Present: normal inspection, full ROM, normal capillary refill.

 Absent: tenderness, pedal edema, joint swelling, calf tenderness


Back exam: Present: normal inspection





Course


                                   Vital Signs











  25





  09:32 11:25 11:35


 


Temperature 98 F  98.1 F


 


Pulse Rate 94  75


 


Respiratory 20 18 18





Rate   


 


Blood Pressure 124/80  108/68


 


O2 Sat by Pulse 99  98





Oximetry   














Medical Decision Making





- Medical Decision Making


Was pt. sent in by a medical professional or institution ([facility]ES[facility] Laurent, NP, urgent 

care, hospital, or nursing home...) When possible be specific


@ -No


Did you speak to anyone other than the patient for history (EMS, parent, family,

police, friend...)? What history was obtained from this source 


@ -No


Did you review nursing and triage notes (agree or disagree)? Why? 


@ -I reviewed and agree with nursing and triage notes


Were old charts reviewed (outside hosp., previous admission, EMS record, old 

EKG, old radiological studies, urgent care reports/EKG's, nursing home records)?

Report findings 


@ -No old charts were reviewed


Differential Diagnosis (chest pain, altered mental status, abdominal pain women,

abdominal pain men, vaginal bleeding, weakness, fever, dyspnea, syncope, headach

e, dizziness, GI bleed, back pain, seizure, CVA, palpatations, mental health, 

musculoskeletal)? 


@ -Differential Abdominal Pain Women:


Appendicitis, Cholecystitis, diverticulosis, ischemic bowel, pancreatitis, 

hepatitis, UTI, gastroenteritis, AAA, incarcerated hernia, bowel obstruction, 

constipation, inflammatory bowel, hepatitis, peptic ulcer disease, splenic 

infarction, perforated viscus, vulvitis, ovarian torsion, PID, kidney stone, 

placenta abruption, this is not meant to be an all-inclusive list





EKG interpreted by me (3pts min.).


@ -none


X-rays interpreted by me (1pt min.).


@ -None done


CT interpreted by me (1pt min.).


@ -None done


U/S interpreted by me (1pt. min.).


@ -Transabdominal ultrasound completed with a single intrauterine pregnancy with

yolk sac identified corresponding to age of 5 weeks 6 days, no fetal pole or 

heart tones detected likely secondary to early gestational age.


What testing was considered but not performed or refused? (CT, X-rays, U/S, 

labs)? Why?


@ -None


What meds were considered but not given or refused? Why?


@ -None


Did you discuss the management of the patient with other professionals 

(professionals i.e. JESUS Laurent, NP, lab, RT, psych nurse, , , 

teacher, , )? Give summary


@ -No


Was smoking cessation discussed for >3mins.?


@ -No


Was critical care preformed (if so, how long)?


@ -No


Were there social determinants of health that impacted care today? How? 

(Homelessness, low income, unemployed, alcoholism, drug addiction, 

transportation, low edu. Level, literacy, decrease access to med. care, FDC, 

rehab)?


@ -No


Was there de-escalation of care discussed even if they declined (Discuss DNR or 

withdrawal of care, Hospice)? DNR status


@ -No


What co-morbidities impacted this encounter? (DM, HTN, Smoking, COPD, CAD, 

Cancer, CVA, ARF, Chemo, Hep., AIDS, mental health diagnosis, sleep apnea, 

morbid obesity)?


@ -None


Was patient admitted / discharged? Hospital course, mention meds given and 

route, prescriptions, significant lab abnormalities, going to OR and other 

pertinent info.


@ -Discharge.  23 female presents emergency department with URI symptoms and 

abdominal pain during pregnancy.  Overall patient is well-appearing in no signs 

of distress.  Vital stable.  Abdominal exam remarkable for mild lower abdominal 

tenderness to palpation.  Laboratory testing including CBC, CMP, urinalysis, 

viral swab unremarkable.  [place] of [address].4.  Ultrasound reveals a intrauter

ine gestation with a gestational 5 weeks 6 days however no fetal pole is 

detected for heart tones secondary to early gestational age.  Patient has 

appointment scheduled with OB on 3/15/2025.  Urinary symptoms likely secondary 

to viral infection this time.  Supportive treatment discussed such as using 

Tylenol in addition to your albuterol inhaler, rest and increase fluids.  Return

parameters discussed.  Case discussed with Dr. Gibson


Undiagnosed new problem with uncertain prognosis?


@ -No


Drug Therapy requiring intensive monitoring for toxicity (Heparin, Nitro, 

Insulin, Cardizem)?


@ -No


Were any procedures done?


@ -No


Diagnosis/symptom?


@ -viral syndrome,  intrauterine Pregnancy, abdominal pain during pregnancy


Acute, or Chronic, or Acute on Chronic?


@ -Acute


Uncomplicated (without systemic symptoms) or Complicated (systemic symptoms)?


@ -Uncomplicated


Side effects of treatment?


@ -No


Exacerbation, Progression, or Severe Exacerbation?


@ -No


Poses a threat to life or bodily function? How? (Chest pain, USA, MI, pneumonia,

PE, COPD, DKA, ARF, appy, cholecystitis, CVA, Diverticulitis, Homicidal, 

Suicidal, threat to staff... and all critical care pts)


@ -No








- Lab Data


Result diagrams: 


                                 25 10:38





                                 25 10:38


                                   Lab Results











  25 Range/Units





  10:38 10:38 10:38 


 


WBC  7.8    (3.8-10.6)  k/uL


 


RBC  3.98    (3.80-5.40)  m/uL


 


Hgb  12.5    (11.4-16.0)  gm/dL


 


Hct  37.6    (34.0-46.0)  %


 


MCV  94.6    (80.0-100.0)  fL


 


MCH  31.4    (25.0-35.0)  pg


 


MCHC  33.1    (31.0-37.0)  g/dL


 


RDW  12.2    (11.5-15.5)  %


 


Plt Count  332    (150-450)  k/uL


 


MPV  6.3    


 


Neutrophils %  72    %


 


Lymphocytes %  19    %


 


Monocytes %  6    %


 


Eosinophils %  2    %


 


Basophils %  0    %


 


Neutrophils #  5.7    (1.3-7.7)  k/uL


 


Lymphocytes #  1.5    (1.0-4.8)  k/uL


 


Monocytes #  0.5    (0-1.0)  k/uL


 


Eosinophils #  0.1    (0-0.7)  k/uL


 


Basophils #  0.0    (0-0.2)  k/uL


 


Sodium    136 L  (137-145)  mmol/L


 


Potassium    3.8  (3.5-5.1)  mmol/L


 


Chloride    102  ()  mmol/L


 


Carbon Dioxide    25  (22-30)  mmol/L


 


Anion Gap    9  mmol/L


 


BUN    10  (7-17)  mg/dL


 


Creatinine    0.63  (0.52-1.04)  mg/dL


 


Est GFR (CKD-EPI)AfAm    >90  (>60 ml/min/1.73 sqM)  


 


Est GFR (CKD-EPI)NonAf    >90  (>60 ml/min/1.73 sqM)  


 


Glucose    74  (74-99)  mg/dL


 


Calcium    9.0  (8.4-10.2)  mg/dL


 


Total Bilirubin    0.4  (0.2-1.3)  mg/dL


 


AST    15  (14-36)  U/L


 


ALT    20  (4-34)  U/L


 


Alkaline Phosphatase    58  ()  U/L


 


Total Protein    6.6  (6.3-8.2)  g/dL


 


Albumin    4.1  (3.5-5.0)  g/dL


 


HCG, Quant    39616.4  mIU/mL


 


Urine Color   Light Yellow   


 


Urine Appearance   Clear   (Clear)  


 


Urine pH   6.0   (5.0-8.0)  


 


Ur Specific Gravity   1.024   (1.001-1.035)  


 


Urine Protein   Negative   (Negative)  


 


Urine Glucose (UA)   Negative   (Negative)  


 


Urine Ketones   Negative   (Negative)  


 


Urine Blood   Negative   (Negative)  


 


Urine Nitrite   Negative   (Negative)  


 


Urine Bilirubin   Negative   (Negative)  


 


Urine Urobilinogen   <2.0   (<2.0)  mg/dL


 


Ur Leukocyte Esterase   Negative   (Negative)  


 


Influenza Type A (PCR)     (Not Detectd)  


 


Influenza Type B (PCR)     (Not Detectd)  


 


RSV (PCR)     (Not Detectd)  


 


SARS-CoV-2 (PCR)     (Not Detectd)  














  25 Range/Units





  10:38 


 


WBC   (3.8-10.6)  k/uL


 


RBC   (3.80-5.40)  m/uL


 


Hgb   (11.4-16.0)  gm/dL


 


Hct   (34.0-46.0)  %


 


MCV   (80.0-100.0)  fL


 


MCH   (25.0-35.0)  pg


 


MCHC   (31.0-37.0)  g/dL


 


RDW   (11.5-15.5)  %


 


Plt Count   (150-450)  k/uL


 


MPV   


 


Neutrophils %   %


 


Lymphocytes %   %


 


Monocytes %   %


 


Eosinophils %   %


 


Basophils %   %


 


Neutrophils #   (1.3-7.7)  k/uL


 


Lymphocytes #   (1.0-4.8)  k/uL


 


Monocytes #   (0-1.0)  k/uL


 


Eosinophils #   (0-0.7)  k/uL


 


Basophils #   (0-0.2)  k/uL


 


Sodium   (137-145)  mmol/L


 


Potassium   (3.5-5.1)  mmol/L


 


Chloride   ()  mmol/L


 


Carbon Dioxide   (22-30)  mmol/L


 


Anion Gap   mmol/L


 


BUN   (7-17)  mg/dL


 


Creatinine   (0.52-1.04)  mg/dL


 


Est GFR (CKD-EPI)AfAm   (>60 ml/min/1.73 sqM)  


 


Est GFR (CKD-EPI)NonAf   (>60 ml/min/1.73 sqM)  


 


Glucose   (74-99)  mg/dL


 


Calcium   (8.4-10.2)  mg/dL


 


Total Bilirubin   (0.2-1.3)  mg/dL


 


AST   (14-36)  U/L


 


ALT   (4-34)  U/L


 


Alkaline Phosphatase   ()  U/L


 


Total Protein   (6.3-8.2)  g/dL


 


Albumin   (3.5-5.0)  g/dL


 


HCG, Quant   mIU/mL


 


Urine Color   


 


Urine Appearance   (Clear)  


 


Urine pH   (5.0-8.0)  


 


Ur Specific Gravity   (1.001-1.035)  


 


Urine Protein   (Negative)  


 


Urine Glucose (UA)   (Negative)  


 


Urine Ketones   (Negative)  


 


Urine Blood   (Negative)  


 


Urine Nitrite   (Negative)  


 


Urine Bilirubin   (Negative)  


 


Urine Urobilinogen   (<2.0)  mg/dL


 


Ur Leukocyte Esterase   (Negative)  


 


Influenza Type A (PCR)  Not Detected  (Not Detectd)  


 


Influenza Type B (PCR)  Not Detected  (Not Detectd)  


 


RSV (PCR)  Not Detected  (Not Detectd)  


 


SARS-CoV-2 (PCR)  Not Detected  (Not Detectd)  














Disposition


Clinical Impression: 


 Viral syndrome, Intrauterine pregnancy, Abdominal pain during pregnancy





Disposition: HOME SELF-CARE


Condition: Good


Instructions (If sedation given, give patient instructions):  Upper Respiratory 

Infection (ED)


Additional Instructions: 


Please return to the Emergency Department if symptoms worsen or any other 

concerns.


Prescriptions: 


Albuterol Inhaler [Ventolin Hfa Inhaler] 1 - 2 puff INHALATION Q6H PRN #1 each


 PRN Reason: Shortness Of Breath


Is patient prescribed a controlled substance at d/c from ED?: No


Referrals: 


Damian Roldan DO [Primary Care Provider] - 1-2 days


Time of Disposition: 12:04

## 2025-03-06 NOTE — US
EXAMINATION TYPE: Transabdominal

 

DATE OF EXAM: 3/6/2025 11:14 AM

 

COMPARISON: NONE for this pregnancy

 

CLINICAL INDICATION: Female, 23 years old with history of cramping,  6 weeks, no US yet; Light crampi
ng within midline pelvis. No bleeding. Hx 1 miscarriage. .

 

TECHNIQUE: Transvaginal (TV) and Transabdominal (TA) with grayscale and color Doppler imaging includi
ng first trimester pregnancy.

 

FINDINGS:

 

EXAM MEASUREMENTS:

 

GESTATIONAL AGE / DATING

 

Physician Established: Not yet established 

Dates by LMP: (5 weeks/5 days)  

** EDC: 2025

Dates by First Scan:  This is first scan 

Dates by Current Scan for: (5 weeks/6 days)  

** EDC: 10/31/2025 by gestational sac.

 

MATERNAL ANATOMY 

 

Uterus: 9.0 x 6.7 x 5.1 cm.

Right Ovary: 3.1 x 2.3 x 1.8 cm. Solid, hypoechoic vascular area seen within:  2.3 x 1.8 x 1.6 cm- ?e
tiology.

Left Ovary: 2.6 x 2.0 x 1.6 cm

Post CDS / Adnexa: Fluid seen in CDS

Presence of free fluid: Fluid seen in CDS

Presence of corpus luteal cyst: Possible within right ovary- Solid, hypoechoic vascular area seen wit
hin: 2.3 x 1.8 x 1.6 cm. .

Presence of subchorionic bleed: Possible, hypoechoic area seen adjacent to gestational sac: 1.4 x 0.9
 x 0.4 cm.

 

GESTATION / FETAL SURVEY

CRL: Not visualized at this time 

Gestational Sac morphology: wnl

Gestational Sac MSD: 1.18 cm (5 weeks/6 days)

Yolk Sac (normal less than 6mm): 2.1 mm

IUP:  Gestational sac and yolk sac seen at this time.

 

Date of LMP: 2025

Beta HcG (if available):  Not available

 

 

IMPRESSION:

Intrauterine gestational sac with yolk sac identified corresponding to ultrasound age of 5 weeks 6 da
ys . No fetal pole or fetal heart tones are identified at this time likely due to early gestational a
ge. Recommend follow-up with pelvic ultrasound and serial beta hCG to ensure further development of t
he fetus.

 

 

 

 

 

 

[facility], Workstation: [id number], 3/6/2025 11:33 AM

## 2025-03-10 NOTE — US
EXAMINATION TYPE: Transabdominal

 

DATE OF EXAM: 3/10/2025 6:04 PM

 

COMPARISON: Us(03/06/2025)

 

CLINICAL INDICATION: Female, 23 years old with history of Pelvic pain; pt is experiencing RUQ pain, C
holecystectomy

 

TECHNIQUE: Transvaginal (TV) and Transabdominal (TA) with grayscale and color Doppler imaging includi
ng first trimester pregnancy.

 

FINDINGS:

 

EXAM MEASUREMENTS:

 

GESTATIONAL AGE / DATING

 

Physician Established: Not yet established 

 

Dates by LMP:  01/25/2025 (6 weeks/2 days)  

** EDC: 11/01/2025

Dates by First Scan: (5 weeks/6 days)  

** EDC: 10/31/2025

Dates by Current Scan for: (6 weeks/1 days)  

** EDC: 11/02/2025

 

MATERNAL ANATOMY 

 

Uterus: 8.3x4.7x5.7cm

Right Ovary: 2.9x2.0x1.8cm

Left Ovary: 1.8x0.8x1.3cm

Post CDS / Adnexa: no ff seen

Presence of free fluid: no

Presence of corpus luteal cyst: 

Hypoechoic, vascular area seen within rt ov: 1.6x2.2x1.6cm

Presence of subchorionic bleed: Hypoechoic area seen adj to GS: 1.1x0.2x1.1cm

 

GESTATION / FETAL SURVEY

CRL: 0.4cm (6 weeks/1 days)

Gestational Sac morphology: Normal

Yolk Sac (normal less than 6mm): 0.3cm

Cardiac Activity/Heart Rate: 105 bpm

Rhythm:  Normal

IUP:  Viable IUP

 

Date of LMP: 01/25/2025

Beta HcG (if available): Not available at this time

 

 

 

 

 

IMPRESSION:

1. Single live intrauterine pregnancy with calculated ultrasound age of 6 weeks and 1 day by means of
 crown rump length with an estimated date of delivery of 11/1/2025. Cardiac activity present with rig
ht reported 105 bpm.

 

2. Hypoechoic area seen adjacent to the gestational sac may represent a small area of subchorionic he
morrhage. Continued attention on follow-up.

 

3. Nonspecific hypoechoic focus is seen in the right ovary measuring up to 2.2 cm with possible vascu
larity. This needs to be further characterized with an MRI pelvis for better characterization to excl
ude underlying solid lesion.

 

 

 

 

[facility], Workstation: [id number], 3/10/2025 7:08 PM

## 2025-03-10 NOTE — US
EXAMINATION TYPE: US abdomen limited

 

DATE OF EXAM: 3/10/2025

 

COMPARISON: NONE

 

CLINICAL INDICATION: Female, 23 years old with history of Epigastric pain; Cholecystectomy

 

TECHNIQUE: Grayscale and color Doppler imaging of the right upper quadrant was performed.

 

FINDINGS:

 

EXAM MEASUREMENTS:

 

Liver Length:  16.4 cm   

Gallbladder Wall:  Surgically absent cm   

CBD:  0.5 cm

Right Kidney:  10.4x4.2x53 cm. 

 

SONOGRAPHER NOTES:slightly limited exam due to overlying bowel

 

Pancreas:  Tail obscured by overlying bowel gas

Liver:  no abnormalities seen  

Gallbladder:  Surgically absent

**Evidence for sonographic Law's sign:  No

CBD:  wnl 

Right Kidney: Mild calyceal dilatation is seen

 

 

 

IMPRESSION: 

Mild calyceal dilatation of the right kidney may relate to mild hydronephrosis. Correlate with renal 
labs. Consider repeat dedicated renal ultrasound if clinically warranted. No other significant sonogr
aphic abnormality.

 

 

 

[facility], Workstation: [id number], 3/10/2025 7:36 PM

## 2025-03-10 NOTE — ED
Abdominal Pain HPI





- General


Chief Complaint: Abdominal Pain


Stated Complaint: abd pain


Time Seen by Provider: 03/10/25 16:14


Source: patient


Mode of arrival: ambulatory


Limitations: no limitations





- History of Present Illness


Initial Comments: 


23-year-old female currently about 6 weeks pregnant presenting with chief 

complaint of abdominal pain.  Patient states she has had worsening pelvic 

cramping.  She is having no vaginal bleeding.  Seems to be mostly in the center,

does have some right sided discomfort.  No dysuria or hematuria.  She also 

reports she has been having sharp pain in the epigastric region.  This does not 

seem to get better or worse with eating.  She does admit to nausea and vomiting 

which has been ongoing throughout her pregnancy.  She does have history of 

cholecystectomy and appendectomy.  No diarrhea or constipation.  No hematochezia

or melena.








- Related Data


                                Home Medications











 Medication  Instructions  Recorded  Confirmed


 


Prenatal Vit No.180/Iron/Folic 1 tab PO HS 21





[Prenatal Plus Tablet]   


 


Ondansetron Odt [Zofran ODT] 8 mg PO ONCE PRN 23


 


Aspirin [Adult Low Dose Aspirin EC] 81 mg PO DAILY 23


 


Omeprazole [PriLOSEC] 40 cap PO DAILY 23








                                  Previous Rx's











 Medication  Instructions  Recorded


 


Ibuprofen [Motrin] 600 mg PO Q6HR PRN #30 tab 23


 


Amoxic-Pot Clav 875-125Mg 1 tab PO Q12HR #20 tablet 23





[Augmentin 875-125]  


 


Famotidine [Pepcid] 20 mg PO BID #14 tablet 24


 


Ondansetron Odt [Zofran ODT] 4 mg PO Q8HR PRN #10 tab 24


 


Metoclopramide [Reglan] 10 mg PO Q8H PRN #15 tab 25


 


Metoclopramide [Reglan] 10 mg PO Q8H #15 tab 25


 


Prenatal Vit-Iron-Folic Acid 1 each PO DAILY #30 cap 25





[Prenatal-U Capsule]  


 


Albuterol Inhaler [Ventolin Hfa 1 - 2 puff INHALATION Q6H PRN #1 25





Inhaler] each 











                                    Allergies











Allergy/AdvReac Type Severity Reaction Status Date / Time


 


poppyseed oil Allergy Severe Anaphylaxis Verified 03/10/25 16:11














Review of Systems


ROS Statement: 


Those systems with pertinent positive or pertinent negative responses have been 

documented in the HPI.





ROS Other: All systems not noted in ROS Statement are negative.





Past Medical History


Past Medical History: Asthma, GERD/Reflux, Pneumonia, Supraventricular 

Tachycardia (SVT)


Additional Past Medical History / Comment(s): Degenerative Disc Disease,covid- 

antibody infusion, SVT, preclampsia


History of Any Multi-Drug Resistant Organisms: None Reported


Past Surgical History: Adenoidectomy, Cholecystectomy, Tonsillectomy


Additional Past Surgical History / Comment(s): Kaycee teeth removed, ganglion 

cyst on left hand removed, D&C,


Past Anesthesia/Blood Transfusion Reactions: No Reported Reaction


Past Psychological History: No Psychological Hx Reported


Smoking Status: Never smoker


Past Alcohol Use History: None Reported


Past Drug Use History: None Reported





- Past Family History


  ** Mother


Family Medical History: No Reported History





General Exam


Limitations: no limitations


General appearance: alert, in no apparent distress


Head exam: Present: atraumatic, normocephalic, normal inspection


Eye exam: Present: normal appearance, EOMI


Neck exam: Present: normal inspection.  Absent: meningismus


Respiratory exam: Present: normal lung sounds bilaterally.  Absent: respiratory 

distress, wheezes, rales, rhonchi, stridor


Cardiovascular Exam: Present: regular rate, normal rhythm, normal heart sounds. 

Absent: systolic murmur, diastolic murmur, rubs, gallop, clicks


GI/Abdominal exam: Present: soft, tenderness (Mid abdomen).  Absent: distended, 

guarding, rebound, rigid


Neurological exam: Present: alert, oriented X3


Psychiatric exam: Present: normal affect, normal mood


Skin exam: Present: warm, dry, normal color





Course


                                   Vital Signs











  03/10/25 03/10/25





  16:04 20:03


 


Temperature 97.9 F 97.9 F


 


Pulse Rate 86 90


 


Respiratory 18 18





Rate  


 


Blood Pressure 145/82 128/62


 


O2 Sat by Pulse 98 100





Oximetry  














Medical Decision Making





- Medical Decision Making


Was pt. sent in by a medical professional or institution (, PA, NP, urgent 

care, hospital, or nursing home...) When possible be specific


@  -No


Did you speak to anyone other than the patient for history (EMS, parent, family,

police, friend...)? What history was obtained from this source 


@  -No


Did you review nursing and triage notes (agree or disagree)?  Why? 


@  -I reviewed and agree with nursing and triage notes


Were old charts reviewed (outside hosp., previous admission, EMS record, old 

EKG, old radiological studies, urgent care reports/EKG's, nursing home records)?

Report findings 


@  -No old charts were reviewed


Differential Diagnosis (chest pain, altered mental status, abdominal pain women,

abdominal pain men, vaginal bleeding, weakness, fever, dyspnea, syncope, 

headache, dizziness, GI bleed, back pain, seizure, CVA, palpatations, mental 

health, musculoskeletal)? 


@  -MDM Differential Abdominal Pain Women:


Appendicitis, Cholecystitis, diverticulosis, ischemic bowel, pancreatitis, he

patitis, UTI, gastroenteritis, AAA, incarcerated hernia, bowel obstruction, 

constipation, inflammatory bowel, hepatitis, peptic ulcer disease, splenic 

infarction, perforated viscus, vulvitis, ovarian torsion, PID, kidney stone, 

placenta abruption... This is not meant to be an all-inclusive list





EKG interpreted by me (3pts min.).


@  -As above


X-rays interpreted by me (1pt min.).


@  -None done


CT interpreted by me (1pt min.).


@  -None done


U/S interpreted by me (1pt. min.).


@  -Ultrasound shows mild calyceal dilatation of the right kidney may relate to 

mild hydronephrosis.  Correlate with renal labs.  Consider repeat dedicated 

renal ultrasound if clinically warranted.  No other significant sonographic 

abnormality


Fetal ultrasound shows single live intrauterine pregnancy with calculated ultr

asound age of 6 weeks and 1 day cardiac activity present 105 bpm.  Hypoechoic 

area seen adjacent to the gestational sac may represent a small area of 

subchorionic hemorrhage.  Nonspecific hypoechoic focus is seen in the right 

ovary measuring up to 2.2 cm with possible vascularity.


What testing was considered but not performed or refused? (CT, X-rays, U/S, 

labs)? Why?


@  -None


What meds were considered but not given or refused? Why?


@  -None


Did you discuss the management of the patient with other professionals 

(professionals i.e. , PA, NP, lab, RT, psych nurse, , , 

teacher, , )? Give summary


@  -No


Was smoking cessation discussed for >3mins.?


@  -No


Was critical care preformed (if so, how long)?


@  -No


Were there social determinants of health that impacted care today? How? 

(Homelessness, low income, unemployed, alcoholism, drug addiction, t

ransportation, low edu. Level, literacy, decrease access to med. care, alf, 

rehab)?


@  -No


Was there de-escalation of care discussed even if they declined (Discuss DNR or 

withdrawal of care, Hospice)? DNR status


@  -No


What co-morbidities impacted this encounter? (DM, HTN, Smoking, COPD, CAD, Cance

r, CVA, ARF, Chemo, Hep., AIDS, mental health diagnosis, sleep apnea, morbid 

obesity)?


@  -None


Was patient admitted / discharged? Hospital course, mention meds given and 

route, prescriptions, significant lab abnormalities, going to OR and other 

pertinent info.


@  -23-year-old female presenting with chief complaint of pelvic pain and 

epigastric pain.  She is currently about 6 weeks pregnant.  History and physical

examination are conducted.  Lab work is grossly unremarkable.  hCG 38,835.5.  

Abdominal ultrasound shows mild dilatation of the calyx of the right kidney, 

considering urine and GFR is normal further testing is unnecessary.  Obstetric 

ultrasound does confirm intrauterine pregnancy with cardiac activity, however 

cardiac activity is a bit low at 105 bpm.  Patient does have a subchorionic 

hemorrhage and a 2.2 cm focus in the right ovary.  Patient is educated on all of

today's findings.  She will be close follow-up with her OB/GYN and  is informed 

of this.  Provided with an order for repeat beta-hCG in 48 hours. follow-up with

PCP.  Report back to ER with any new or worsening symptoms.  Discussed return 

parameters and answered all questions.  Patient conveyed verbal understanding 

and agreed to the plan.  I discussed this case in detail with my attending Dr. De La Cruz


Undiagnosed new problem with uncertain prognosis?


@  -No


Drug Therapy requiring intensive monitoring for toxicity (Heparin, Nitro, 

Insulin, Cardizem)?


@  -No


Were any procedures done?


@  -No


Diagnosis/symptom?


@  -Threatened , abdominal pain in pregnancy


Acute, or Chronic, or Acute on Chronic?


@  -Acute


Uncomplicated (without systemic symptoms) or Complicated (systemic symptoms)?


@  -Uncomplicated


Side effects of treatment?


@  -No


Exacerbation, Progression, or Severe Exacerbation?


@  -No














- Lab Data


Result diagrams: 


                                 03/10/25 16:39





                                 03/10/25 16:38


                                   Lab Results











  03/10/25 03/10/25 03/10/25 Range/Units





  16:38 16:38 16:39 


 


WBC    10.2  (3.8-10.6)  k/uL


 


RBC    4.17  (3.80-5.40)  m/uL


 


Hgb    12.7  (11.4-16.0)  gm/dL


 


Hct    39.2  (34.0-46.0)  %


 


MCV    94.1  (80.0-100.0)  fL


 


MCH    30.6  (25.0-35.0)  pg


 


MCHC    32.5  (31.0-37.0)  g/dL


 


RDW    12.2  (11.5-15.5)  %


 


Plt Count    292  (150-450)  k/uL


 


MPV    6.3  


 


Neutrophils %    67  %


 


Lymphocytes %    25  %


 


Monocytes %    5  %


 


Eosinophils %    2  %


 


Basophils %    0  %


 


Neutrophils #    6.8  (1.3-7.7)  k/uL


 


Lymphocytes #    2.5  (1.0-4.8)  k/uL


 


Monocytes #    0.5  (0-1.0)  k/uL


 


Eosinophils #    0.2  (0-0.7)  k/uL


 


Basophils #    0.0  (0-0.2)  k/uL


 


Sodium   136 L   (137-145)  mmol/L


 


Potassium   3.7   (3.5-5.1)  mmol/L


 


Chloride   100   ()  mmol/L


 


Carbon Dioxide   25   (22-30)  mmol/L


 


Anion Gap   11   mmol/L


 


BUN   9   (7-17)  mg/dL


 


Creatinine   0.57   (0.52-1.04)  mg/dL


 


Est GFR (CKD-EPI)AfAm   >90   (>60 ml/min/1.73 sqM)  


 


Est GFR (CKD-EPI)NonAf   >90   (>60 ml/min/1.73 sqM)  


 


Glucose   82   (74-99)  mg/dL


 


Calcium   9.1   (8.4-10.2)  mg/dL


 


Total Bilirubin   0.5   (0.2-1.3)  mg/dL


 


AST   17   (14-36)  U/L


 


ALT   22   (4-34)  U/L


 


Alkaline Phosphatase   66   ()  U/L


 


Total Protein   7.1   (6.3-8.2)  g/dL


 


Albumin   4.5   (3.5-5.0)  g/dL


 


Amylase   68   ()  U/L


 


Lipase   104   ()  U/L


 


HCG, Quant   60842.5   mIU/mL


 


Urine Color  Yellow    


 


Urine Appearance  Clear    (Clear)  


 


Urine pH  6.0    (5.0-8.0)  


 


Ur Specific Gravity  1.023    (1.001-1.035)  


 


Urine Protein  Negative    (Negative)  


 


Urine Glucose (UA)  Negative    (Negative)  


 


Urine Ketones  Negative    (Negative)  


 


Urine Blood  Negative    (Negative)  


 


Urine Nitrite  Negative    (Negative)  


 


Urine Bilirubin  Negative    (Negative)  


 


Urine Urobilinogen  <2.0    (<2.0)  mg/dL


 


Ur Leukocyte Esterase  Negative    (Negative)  














Disposition


Clinical Impression: 


 Threatened miscarriage





Disposition: HOME SELF-CARE


Condition: Good


Instructions (If sedation given, give patient instructions):  Threatened 

Miscarriage (ED)


Additional Instructions: 


Follow-up with your OB/GYN regarding your ultrasound findings today.  You will 

be given an order for repeat beta-hCG in 48 hours, you can bring this to the 

with her lab with you to have your blood drawn.  Report back to ER with any new 

or worsening symptoms.


Is patient prescribed a controlled substance at d/c from ED?: No


Referrals: 


Damian Roldan DO [Primary Care Provider] - 1-2 days


Maribel Asencio DO [Doctor of Osteopathic Medicine] - 1-2 days


Time of Disposition: 19:55